# Patient Record
Sex: FEMALE | Race: WHITE | Employment: FULL TIME | URBAN - METROPOLITAN AREA
[De-identification: names, ages, dates, MRNs, and addresses within clinical notes are randomized per-mention and may not be internally consistent; named-entity substitution may affect disease eponyms.]

---

## 2019-08-25 ENCOUNTER — ED HISTORICAL/CONVERTED ENCOUNTER (OUTPATIENT)
Dept: OTHER | Age: 34
End: 2019-08-25

## 2020-09-01 ENCOUNTER — TELEPHONE (OUTPATIENT)
Dept: OBGYN CLINIC | Age: 35
End: 2020-09-01

## 2020-09-01 NOTE — TELEPHONE ENCOUNTER
I called patient and she was told to take her pill every day at the same time. She did state that she has been on an antibiotic which can be affecting her vaginal bleeding. Patient was informed to monitor and to call back after 3 weeks if still bleeding. If bleeding becomes heavy, she is to call back also.

## 2021-05-12 ENCOUNTER — APPOINTMENT (OUTPATIENT)
Dept: MRI IMAGING | Age: 36
DRG: 082 | End: 2021-05-12
Attending: EMERGENCY MEDICINE
Payer: COMMERCIAL

## 2021-05-12 ENCOUNTER — HOSPITAL ENCOUNTER (INPATIENT)
Age: 36
LOS: 2 days | Discharge: HOME | DRG: 082 | End: 2021-05-14
Attending: EMERGENCY MEDICINE | Admitting: FAMILY MEDICINE
Payer: COMMERCIAL

## 2021-05-12 DIAGNOSIS — H46.9 LEFT OPTIC NEURITIS: Primary | ICD-10-CM

## 2021-05-12 DIAGNOSIS — G37.9 DEMYELINATING DISEASE OF CENTRAL NERVOUS SYSTEM (HCC): ICD-10-CM

## 2021-05-12 PROBLEM — I63.9 CVA (CEREBRAL VASCULAR ACCIDENT) (HCC): Status: ACTIVE | Noted: 2021-05-12

## 2021-05-12 LAB
ALBUMIN SERPL-MCNC: 3.8 G/DL (ref 3.5–5)
ALBUMIN/GLOB SERPL: 0.9 {RATIO} (ref 1.1–2.2)
ALP SERPL-CCNC: 47 U/L (ref 45–117)
ALT SERPL-CCNC: 37 U/L (ref 12–78)
ANION GAP SERPL CALC-SCNC: 5 MMOL/L (ref 5–15)
APPEARANCE UR: CLEAR
AST SERPL-CCNC: 24 U/L (ref 15–37)
BACTERIA URNS QL MICRO: NEGATIVE /HPF
BASOPHILS # BLD: 0 K/UL (ref 0–0.1)
BASOPHILS NFR BLD: 1 % (ref 0–1)
BILIRUB SERPL-MCNC: 0.5 MG/DL (ref 0.2–1)
BILIRUB UR QL: NEGATIVE
BUN SERPL-MCNC: 8 MG/DL (ref 6–20)
BUN/CREAT SERPL: 12 (ref 12–20)
CALCIUM SERPL-MCNC: 9.3 MG/DL (ref 8.5–10.1)
CHLORIDE SERPL-SCNC: 106 MMOL/L (ref 97–108)
CO2 SERPL-SCNC: 27 MMOL/L (ref 21–32)
COLOR UR: NORMAL
COMMENT, HOLDF: NORMAL
CREAT SERPL-MCNC: 0.66 MG/DL (ref 0.55–1.02)
CRP SERPL-MCNC: 0.32 MG/DL (ref 0–0.6)
DIFFERENTIAL METHOD BLD: NORMAL
EOSINOPHIL # BLD: 0.1 K/UL (ref 0–0.4)
EOSINOPHIL NFR BLD: 2 % (ref 0–7)
EPITH CASTS URNS QL MICRO: NORMAL /LPF
ERYTHROCYTE [DISTWIDTH] IN BLOOD BY AUTOMATED COUNT: 12.5 % (ref 11.5–14.5)
ERYTHROCYTE [SEDIMENTATION RATE] IN BLOOD: 2 MM/HR (ref 0–20)
GLOBULIN SER CALC-MCNC: 4.3 G/DL (ref 2–4)
GLUCOSE SERPL-MCNC: 86 MG/DL (ref 65–100)
GLUCOSE UR STRIP.AUTO-MCNC: NEGATIVE MG/DL
HCG UR QL: NEGATIVE
HCT VFR BLD AUTO: 42.1 % (ref 35–47)
HGB BLD-MCNC: 13.9 G/DL (ref 11.5–16)
HGB UR QL STRIP: NEGATIVE
HYALINE CASTS URNS QL MICRO: NORMAL /LPF (ref 0–5)
IMM GRANULOCYTES # BLD AUTO: 0 K/UL (ref 0–0.04)
IMM GRANULOCYTES NFR BLD AUTO: 0 % (ref 0–0.5)
KETONES UR QL STRIP.AUTO: NEGATIVE MG/DL
LEUKOCYTE ESTERASE UR QL STRIP.AUTO: NEGATIVE
LYMPHOCYTES # BLD: 1.8 K/UL (ref 0.8–3.5)
LYMPHOCYTES NFR BLD: 28 % (ref 12–49)
MCH RBC QN AUTO: 30.1 PG (ref 26–34)
MCHC RBC AUTO-ENTMCNC: 33 G/DL (ref 30–36.5)
MCV RBC AUTO: 91.1 FL (ref 80–99)
MONOCYTES # BLD: 0.3 K/UL (ref 0–1)
MONOCYTES NFR BLD: 5 % (ref 5–13)
NEUTS SEG # BLD: 4 K/UL (ref 1.8–8)
NEUTS SEG NFR BLD: 64 % (ref 32–75)
NITRITE UR QL STRIP.AUTO: NEGATIVE
NRBC # BLD: 0 K/UL (ref 0–0.01)
NRBC BLD-RTO: 0 PER 100 WBC
PH UR STRIP: 7 [PH] (ref 5–8)
PLATELET # BLD AUTO: 246 K/UL (ref 150–400)
PMV BLD AUTO: 10.2 FL (ref 8.9–12.9)
POTASSIUM SERPL-SCNC: 4 MMOL/L (ref 3.5–5.1)
PROT SERPL-MCNC: 8.1 G/DL (ref 6.4–8.2)
PROT UR STRIP-MCNC: NEGATIVE MG/DL
RBC # BLD AUTO: 4.62 M/UL (ref 3.8–5.2)
RBC #/AREA URNS HPF: NORMAL /HPF (ref 0–5)
SAMPLES BEING HELD,HOLD: NORMAL
SODIUM SERPL-SCNC: 138 MMOL/L (ref 136–145)
SP GR UR REFRACTOMETRY: 1.01 (ref 1–1.03)
UR CULT HOLD, URHOLD: NORMAL
UROBILINOGEN UR QL STRIP.AUTO: 0.2 EU/DL (ref 0.2–1)
WBC # BLD AUTO: 6.3 K/UL (ref 3.6–11)
WBC URNS QL MICRO: NORMAL /HPF (ref 0–4)

## 2021-05-12 PROCEDURE — 99284 EMERGENCY DEPT VISIT MOD MDM: CPT

## 2021-05-12 PROCEDURE — 74011000258 HC RX REV CODE- 258: Performed by: EMERGENCY MEDICINE

## 2021-05-12 PROCEDURE — 80053 COMPREHEN METABOLIC PANEL: CPT

## 2021-05-12 PROCEDURE — 74011000250 HC RX REV CODE- 250: Performed by: FAMILY MEDICINE

## 2021-05-12 PROCEDURE — 85025 COMPLETE CBC W/AUTO DIFF WBC: CPT

## 2021-05-12 PROCEDURE — 84100 ASSAY OF PHOSPHORUS: CPT

## 2021-05-12 PROCEDURE — 83735 ASSAY OF MAGNESIUM: CPT

## 2021-05-12 PROCEDURE — A9575 INJ GADOTERATE MEGLUMI 0.1ML: HCPCS | Performed by: EMERGENCY MEDICINE

## 2021-05-12 PROCEDURE — 70553 MRI BRAIN STEM W/O & W/DYE: CPT

## 2021-05-12 PROCEDURE — 74011250636 HC RX REV CODE- 250/636: Performed by: EMERGENCY MEDICINE

## 2021-05-12 PROCEDURE — 81025 URINE PREGNANCY TEST: CPT

## 2021-05-12 PROCEDURE — 82550 ASSAY OF CK (CPK): CPT

## 2021-05-12 PROCEDURE — 74011250637 HC RX REV CODE- 250/637: Performed by: FAMILY MEDICINE

## 2021-05-12 PROCEDURE — 81001 URINALYSIS AUTO W/SCOPE: CPT

## 2021-05-12 PROCEDURE — 83690 ASSAY OF LIPASE: CPT

## 2021-05-12 PROCEDURE — 65660000000 HC RM CCU STEPDOWN

## 2021-05-12 PROCEDURE — 85652 RBC SED RATE AUTOMATED: CPT

## 2021-05-12 PROCEDURE — 96374 THER/PROPH/DIAG INJ IV PUSH: CPT

## 2021-05-12 PROCEDURE — 80061 LIPID PANEL: CPT

## 2021-05-12 PROCEDURE — 74011250636 HC RX REV CODE- 250/636: Performed by: FAMILY MEDICINE

## 2021-05-12 PROCEDURE — 36415 COLL VENOUS BLD VENIPUNCTURE: CPT

## 2021-05-12 PROCEDURE — 86140 C-REACTIVE PROTEIN: CPT

## 2021-05-12 RX ORDER — ACETAMINOPHEN 325 MG/1
650 TABLET ORAL
Status: DISCONTINUED | OUTPATIENT
Start: 2021-05-12 | End: 2021-05-14 | Stop reason: HOSPADM

## 2021-05-12 RX ORDER — DEXAMETHASONE SODIUM PHOSPHATE 4 MG/ML
10 INJECTION, SOLUTION INTRA-ARTICULAR; INTRALESIONAL; INTRAMUSCULAR; INTRAVENOUS; SOFT TISSUE
Status: DISCONTINUED | OUTPATIENT
Start: 2021-05-12 | End: 2021-05-12

## 2021-05-12 RX ORDER — SODIUM CHLORIDE 9 MG/ML
75 INJECTION, SOLUTION INTRAVENOUS CONTINUOUS
Status: DISPENSED | OUTPATIENT
Start: 2021-05-12 | End: 2021-05-13

## 2021-05-12 RX ORDER — ACETAMINOPHEN 650 MG/1
650 SUPPOSITORY RECTAL
Status: DISCONTINUED | OUTPATIENT
Start: 2021-05-12 | End: 2021-05-14 | Stop reason: HOSPADM

## 2021-05-12 RX ORDER — GADOTERATE MEGLUMINE 376.9 MG/ML
10 INJECTION INTRAVENOUS
Status: COMPLETED | OUTPATIENT
Start: 2021-05-12 | End: 2021-05-12

## 2021-05-12 RX ORDER — IBUPROFEN 200 MG
200 TABLET ORAL
COMMUNITY
End: 2021-11-18 | Stop reason: ALTCHOICE

## 2021-05-12 RX ORDER — CHOLECALCIFEROL (VITAMIN D3) 50 MCG
CAPSULE ORAL DAILY
COMMUNITY

## 2021-05-12 RX ORDER — ACETAMINOPHEN 325 MG/1
650 TABLET ORAL
COMMUNITY
End: 2021-11-18 | Stop reason: ALTCHOICE

## 2021-05-12 RX ORDER — THERA TABS 400 MCG
1 TAB ORAL DAILY
COMMUNITY

## 2021-05-12 RX ORDER — ONDANSETRON 2 MG/ML
4 INJECTION INTRAMUSCULAR; INTRAVENOUS
Status: DISCONTINUED | OUTPATIENT
Start: 2021-05-12 | End: 2021-05-14 | Stop reason: HOSPADM

## 2021-05-12 RX ORDER — DIPHENHYDRAMINE HYDROCHLORIDE 50 MG/ML
25 INJECTION, SOLUTION INTRAMUSCULAR; INTRAVENOUS
Status: DISCONTINUED | OUTPATIENT
Start: 2021-05-12 | End: 2021-05-12

## 2021-05-12 RX ORDER — NORGESTIMATE AND ETHINYL ESTRADIOL 0.25-0.035
1 KIT ORAL DAILY
COMMUNITY

## 2021-05-12 RX ORDER — SODIUM CHLORIDE 0.9 % (FLUSH) 0.9 %
10 SYRINGE (ML) INJECTION
Status: COMPLETED | OUTPATIENT
Start: 2021-05-12 | End: 2021-05-12

## 2021-05-12 RX ORDER — ASCORBIC ACID 500 MG
1000 TABLET ORAL DAILY
COMMUNITY

## 2021-05-12 RX ADMIN — SODIUM CHLORIDE 75 ML/HR: 9 INJECTION, SOLUTION INTRAVENOUS at 20:00

## 2021-05-12 RX ADMIN — SODIUM CHLORIDE 1000 MG: 900 INJECTION INTRAVENOUS at 19:19

## 2021-05-12 RX ADMIN — Medication 10 ML: at 14:40

## 2021-05-12 RX ADMIN — ACETAMINOPHEN 650 MG: 325 TABLET ORAL at 19:59

## 2021-05-12 RX ADMIN — FAMOTIDINE 20 MG: 10 INJECTION, SOLUTION INTRAVENOUS at 20:00

## 2021-05-12 RX ADMIN — GADOTERATE MEGLUMINE 10 ML: 376.9 INJECTION INTRAVENOUS at 14:39

## 2021-05-12 NOTE — ED PROVIDER NOTES
29-year-old female with no significant past medical history presents to the emergency department referred from ophthalmology for possible optic neuritis of the left eye. She reports a history of left eye pain that is worse with extraocular movement to the left and decreased peripheral vision scotoma in the left. She notes an associated mild headache that she describes as 2/10 but denies any associated nausea, vomiting. She does state that she is slightly photophobic and then I from her symptoms. She denies any other focal numbness, weakness, difficulty speaking or walking. No history of prior arrhythmia and issues or migraine headaches. Visual acuity at ophthalmologist office was 20/20 in right ankle, 20/30 and left eye with normal IOP. Ophthalmologist recommended lab evaluation and MRI of brain and orbits with neurology consultation. Vision Change   Associated symptoms include photophobia. Pertinent negatives include no numbness, no nausea, no vomiting, no weakness and no fever. History reviewed. No pertinent past medical history. No past surgical history on file. History reviewed. No pertinent family history.     Social History     Socioeconomic History    Marital status:      Spouse name: Not on file    Number of children: Not on file    Years of education: Not on file    Highest education level: Not on file   Occupational History    Not on file   Social Needs    Financial resource strain: Not on file    Food insecurity     Worry: Not on file     Inability: Not on file    Transportation needs     Medical: Not on file     Non-medical: Not on file   Tobacco Use    Smoking status: Not on file   Substance and Sexual Activity    Alcohol use: Not on file    Drug use: Not on file    Sexual activity: Not on file   Lifestyle    Physical activity     Days per week: Not on file     Minutes per session: Not on file    Stress: Not on file   Relationships    Social connections Talks on phone: Not on file     Gets together: Not on file     Attends Bahai service: Not on file     Active member of club or organization: Not on file     Attends meetings of clubs or organizations: Not on file     Relationship status: Not on file    Intimate partner violence     Fear of current or ex partner: Not on file     Emotionally abused: Not on file     Physically abused: Not on file     Forced sexual activity: Not on file   Other Topics Concern    Not on file   Social History Narrative    Not on file         ALLERGIES: Patient has no known allergies. Review of Systems   Constitutional: Negative for activity change, appetite change, chills and fever. HENT: Negative for congestion, rhinorrhea, sinus pressure, sneezing and sore throat. Eyes: Positive for photophobia and visual disturbance. Respiratory: Negative for cough and shortness of breath. Cardiovascular: Negative for chest pain. Gastrointestinal: Negative for abdominal pain, blood in stool, constipation, diarrhea, nausea and vomiting. Genitourinary: Negative for difficulty urinating, dysuria, flank pain, frequency, hematuria, menstrual problem, urgency, vaginal bleeding and vaginal discharge. Musculoskeletal: Negative for arthralgias, back pain, myalgias and neck pain. Skin: Negative for rash and wound. Neurological: Positive for headaches. Negative for syncope, weakness and numbness. Psychiatric/Behavioral: Negative for self-injury and suicidal ideas. All other systems reviewed and are negative. Vitals:    05/12/21 1112   BP: 135/79   Pulse: 73   Resp: 16   Temp: 98.4 °F (36.9 °C)   SpO2: 100%            Physical Exam  Vitals signs and nursing note reviewed. Constitutional:       General: She is not in acute distress. Appearance: Normal appearance. She is well-developed. She is not diaphoretic. Comments: Pleasant, no acute distress. HENT:      Head: Normocephalic and atraumatic.       Nose: Nose normal.   Eyes:      Extraocular Movements: Extraocular movements intact. Conjunctiva/sclera: Conjunctivae normal.      Pupils: Pupils are equal, round, and reactive to light. Comments: Decreased vision in left upper and lower visual fields and left eye, right eye reportedly normal.  Pupils dilated from ophthalmologic exam prior to arrival.   Neck:      Musculoskeletal: Normal range of motion and neck supple. Cardiovascular:      Rate and Rhythm: Normal rate and regular rhythm. Heart sounds: Normal heart sounds. Pulmonary:      Effort: Pulmonary effort is normal.      Breath sounds: Normal breath sounds. Abdominal:      General: There is no distension. Palpations: Abdomen is soft. Tenderness: There is no abdominal tenderness. Musculoskeletal:         General: No tenderness. Skin:     General: Skin is warm and dry. Neurological:      General: No focal deficit present. Mental Status: She is alert and oriented to person, place, and time. Cranial Nerves: No cranial nerve deficit. Sensory: No sensory deficit. Motor: No weakness. Coordination: Coordination normal.      Gait: Gait normal.      Comments: Intact sensation, 5/5 strength in all 4 extremities, intact finger to nose, neg pronator drift, fluent speech, CN intact. MDM   29-year-old female presents referred from ophthalmology for evaluation for possible optic neuritis with MRI, labs and neurology consultation. She is afebrile with vital signs stable no acute distress. Otherwise neuro intact, as above. UA shows no evidence of infection  hCG negative   Labs returned showing no significant abnormalities, normal inflammatory markers  MRI brain/orbits shows evidence suggestive of left optic neuritis    Neurology was consulted    Procedures    3:45 discussed case with Dr. Tyrese Navarro who recommends admission to the hospitalist service with 1 g IV Solu-Medrol.      Perfect Serve Consult for Admission  4:18 PM    ED Room Number: R38/R38  Patient Name and age:  Joyce Hayes 30 y.o.  female  Working Diagnosis:   1. Left optic neuritis        COVID-19 Suspicion:  no  Sepsis present:  no  Reassessment needed: no  Code Status:  Full Code  Readmission: no  Isolation Requirements:  no  Recommended Level of Care:  med/surg  Department:[facility] Adult ED - 21   Other: MRI shows optic neuritis. Patient presented with left sided visual field cut.  Neurology on board and rec'd 1 gm solumedrol daily and further neuro eval.

## 2021-05-12 NOTE — PROGRESS NOTES
Admission Medication Reconciliation:      Spoke with patient by telephone @ [phone], unable to speak with patient face to face at this time due to general isolation precautions in the ED related to COVID-19 pandemic. Patient is a reliable historian. RX query is available at this time. Interview included questions regarding use of:  PTA medications including prescription/OTC, vitamins, supplements, inhaled, topical, injectable, otic and ophthalmic medications  Alcohol, nicotine products, THC and related compounds, illicit drugs, stimulant use (i.e., Red Bull), agents used to assist with sleep and/or pain control issues, and whether patient uses other people's medications of any kind    Medication changes (never reviewed): Added all agents    Thank you for allowing me to participate in the care of your patient. Coleen Wiley PharmD, RN # [phone]       [facility] pharmacy benefit data reflects medications filled and processed through the patient's insurance, however   this data does NOT capture whether the medication was picked up or is currently being taken by the patient. Allergies:  Patient has no known allergies. Significant PMH/Disease States: History reviewed. No pertinent past medical history. Chief Complaint for this Admission:    Chief Complaint   Patient presents with    Vision Change     Prior to Admission Medications:   Prior to Admission Medications   Prescriptions Last Dose Informant Taking? B.infantis-B.ani-B.long-B.bifi (Probiotic 4X) 10-15 mg TbEC 5/11/2021 at Unknown time  Yes   Sig: Take  by mouth daily. acetaminophen (TYLENOL) 325 mg tablet 5/5/2021 at Unknown time  Yes   Sig: Take 650 mg by mouth every four (4) hours as needed for Pain. ascorbic acid, vitamin C, (Vitamin C) 500 mg tablet 5/11/2021 at Unknown time  Yes   Sig: Take 1,000 mg by mouth daily.    ibuprofen (AdviL) 200 mg tablet 5/5/2021 at Unknown time  Yes   Sig: Take 200 mg by mouth every eight (8) hours as needed for Pain.   norgestimate-ethinyl estradioL (Sprintec, 28,) 0.25-35 mg-mcg tab 5/11/2021 at Unknown time  Yes   Sig: Take 1 Tab by mouth daily. therapeutic multivitamin (THERAGRAN) tablet 5/11/2021 at Unknown time  Yes   Sig: Take 1 Tab by mouth daily. Facility-Administered Medications: None     Please contact the main inpatient pharmacy with any questions or concerns at [phone] and we will direct you to the clinical pharmacist covering this patient's care while in-house.    NANCY Marshall

## 2021-05-12 NOTE — H&P
History & Physical    Primary Care Provider: None  Source of Information: Patient     History of Presenting Illness:   Alberto Tenorio is a 30 y.o. female who presents with left eye vision problems    History was probably obtained from the patient    Patient reports that she started experiencing blurred vision in the left eye associated with eye pain with extraocular movement to the left. Patient reports that she had mild headache associated with her symptoms. Got concerned and went to her ophthalmologist who told her to come to the ER for concern for a central process. Patient reports that she wears glasses off and on but has never had any eye problems. [place] and is concerned about Lyme disease. Patient reports that she was also slightly photophobic. Patient came to the ER and was found to have optic neuritis and was requested to be admitted to the hospital service. Patient denies any other complaints or problems. Patient denies any history of multiple sclerosis    The patient denies any sore throat, trouble swallowing, trouble with speech, chest pain, SOB, cough, fever, chills, N/V/D, abd pain, urinary symptoms, constipation, recent travels, sick contacts, focal or generalized neurological symptoms,  falls, injuries, rashes, contact with COVID-19 diagnosed patients, hematemesis, melena, hemoptysis, hematuria, rashes, denies starting any new medications and denies any other concerns or problems besides as mentioned above. Review of Systems:  A comprehensive review of systems was negative except for that written in the History of Present Illness. All other systems reviewed, pertinent positives and negatives noted in HPI    History reviewed. No pertinent past medical history. No past surgical history on file. Prior to Admission medications    Medication Sig Start Date End Date Taking?  Authorizing Provider   norgestimate-ethinyl estradioL (Sprintec, 28,) 0.25-35 mg-mcg tab Take 1 Tab by mouth daily. Yes Provider, Historical   therapeutic multivitamin (THERAGRAN) tablet Take 1 Tab by mouth daily. Yes Provider, Historical   ascorbic acid, vitamin C, (Vitamin C) 500 mg tablet Take 1,000 mg by mouth daily. Yes Provider, Historical   B.infantis-B.ani-B.long-B.bifi (Probiotic 4X) 10-15 mg TbEC Take  by mouth daily. Yes Provider, Historical   acetaminophen (TYLENOL) 325 mg tablet Take 650 mg by mouth every four (4) hours as needed for Pain. Yes Provider, Historical   ibuprofen (AdviL) 200 mg tablet Take 200 mg by mouth every eight (8) hours as needed for Pain. Yes Provider, Historical     No Known Allergies   History reviewed. No pertinent family history. Family history was discussed with the patient, all pertinent and relevant details are mentioned as above, no other pertinent and relevant family history was noted on my discussion with the patient.   Patient specifically denies any history of Gaucher disease in the family  SOCIAL HISTORY:  Patient resides:  Independently x   Assisted Living    SNF    With family care       Smoking history:   None    Former x   Chronic      Alcohol history:   None    Social x   Chronic      Ambulates:   Independently x   w/cane    w/walker    w/wc    CODE STATUS:  DNR    Full x   Other      Objective:     Physical Exam:     Visit Vitals  /79   Pulse 73   Temp 98.4 °F (36.9 °C)   Resp 16   SpO2 100%      O2 Device: None (Room air)    General : alert x 3, awake, no acute distress, resting in bed, pleasant /female, appears to be stated age  [de-identified]: PEERL, EOMI, decreased visual acuity left eye  Neck: supple, no JVD, no meningeal signs  Chest: Clear to auscultation bilaterally   CVS: S1 S2 heard, Capillary refill less than 2 seconds  Abd: soft/ Non tender, non distended, BS physiological,   Ext: no clubbing, no cyanosis, no edema, brisk 2+ DP pulses  Neuro/Psych: pleasant mood and affect, CN 2-12 grossly intact, sensory grossly within normal limit, Strength 5/5 in all extremities, DTR 1+ x 4  Skin: warm           Data Review:     Recent Days:  Recent Labs     05/12/21  1201   WBC 6.3   HGB 13.9   HCT 42.1        Recent Labs     05/12/21  1201      K 4.0      CO2 27   GLU 86   BUN 8   CREA 0.66   CA 9.3   ALB 3.8   ALT 37     No results for input(s): PH, PCO2, PO2, HCO3, FIO2 in the last 72 hours. 24 Hour Results:  Recent Results (from the past 24 hour(s))   CBC WITH AUTOMATED DIFF    Collection Time: 05/12/21 12:01 PM   Result Value Ref Range    WBC 6.3 3.6 - 11.0 K/uL    RBC 4.62 3.80 - 5.20 M/uL    HGB 13.9 11.5 - 16.0 g/dL    HCT 42.1 35.0 - 47.0 %    MCV 91.1 80.0 - 99.0 FL    MCH 30.1 26.0 - 34.0 PG    MCHC 33.0 30.0 - 36.5 g/dL    RDW 12.5 11.5 - 14.5 %    PLATELET 985 224 - 077 K/uL    MPV 10.2 8.9 - 12.9 FL    NRBC 0.0 0  WBC    ABSOLUTE NRBC 0.00 0.00 - 0.01 K/uL    NEUTROPHILS 64 32 - 75 %    LYMPHOCYTES 28 12 - 49 %    MONOCYTES 5 5 - 13 %    EOSINOPHILS 2 0 - 7 %    BASOPHILS 1 0 - 1 %    IMMATURE GRANULOCYTES 0 0.0 - 0.5 %    ABS. NEUTROPHILS 4.0 1.8 - 8.0 K/UL    ABS. LYMPHOCYTES 1.8 0.8 - 3.5 K/UL    ABS. MONOCYTES 0.3 0.0 - 1.0 K/UL    ABS. EOSINOPHILS 0.1 0.0 - 0.4 K/UL    ABS. BASOPHILS 0.0 0.0 - 0.1 K/UL    ABS. IMM. GRANS. 0.0 0.00 - 0.04 K/UL    DF AUTOMATED     METABOLIC PANEL, COMPREHENSIVE    Collection Time: 05/12/21 12:01 PM   Result Value Ref Range    Sodium 138 136 - 145 mmol/L    Potassium 4.0 3.5 - 5.1 mmol/L    Chloride 106 97 - 108 mmol/L    CO2 27 21 - 32 mmol/L    Anion gap 5 5 - 15 mmol/L    Glucose 86 65 - 100 mg/dL    BUN 8 6 - 20 MG/DL    Creatinine 0.66 0.55 - 1.02 MG/DL    BUN/Creatinine ratio 12 12 - 20      GFR est AA >60 >60 ml/min/1.73m2    GFR est non-AA >60 >60 ml/min/1.73m2    Calcium 9.3 8.5 - 10.1 MG/DL    Bilirubin, total 0.5 0.2 - 1.0 MG/DL    ALT (SGPT) 37 12 - 78 U/L    AST (SGOT) 24 15 - 37 U/L    Alk.  phosphatase 47 45 - 117 U/L    Protein, total 8.1 6.4 - 8.2 g/dL    Albumin 3.8 3.5 - 5.0 g/dL    Globulin 4.3 (H) 2.0 - 4.0 g/dL    A-G Ratio 0.9 (L) 1.1 - 2.2     SED RATE (ESR)    Collection Time: 05/12/21 12:01 PM   Result Value Ref Range    Sed rate, automated 2 0 - 20 mm/hr   C REACTIVE PROTEIN, QT    Collection Time: 05/12/21 12:01 PM   Result Value Ref Range    C-Reactive protein 0.32 0.00 - 0.60 mg/dL   HCG URINE, QL. - POC    Collection Time: 05/12/21 12:03 PM   Result Value Ref Range    Pregnancy test,urine (POC) Negative NEG     URINALYSIS W/MICROSCOPIC    Collection Time: 05/12/21 12:06 PM   Result Value Ref Range    Color YELLOW/STRAW      Appearance CLEAR CLEAR      Specific gravity 1.009 1.003 - 1.030      pH (UA) 7.0 5.0 - 8.0      Protein Negative NEG mg/dL    Glucose Negative NEG mg/dL    Ketone Negative NEG mg/dL    Bilirubin Negative NEG      Blood Negative NEG      Urobilinogen 0.2 0.2 - 1.0 EU/dL    Nitrites Negative NEG      Leukocyte Esterase Negative NEG      WBC 0-4 0 - 4 /hpf    RBC 0-5 0 - 5 /hpf    Epithelial cells FEW FEW /lpf    Bacteria Negative NEG /hpf    Hyaline cast 0-2 0 - 5 /lpf   URINE CULTURE HOLD SAMPLE    Collection Time: 05/12/21 12:06 PM    Specimen: Serum; Urine   Result Value Ref Range    Urine culture hold        Urine on hold in Microbiology dept for 2 days. If unpreserved urine is submitted, it cannot be used for addtional testing after 24 hours, recollection will be required. SAMPLES BEING HELD    Collection Time: 05/12/21 12:06 PM   Result Value Ref Range    SAMPLES BEING HELD 1RED,1BLU     COMMENT        Add-on orders for these samples will be processed based on acceptable specimen integrity and analyte stability, which may vary by analyte. Imaging:   [facility] SURGICAL [facility] OF [facility] Cont    Result Date: 5/12/2021  1. Findings consistent with left optic neuritis involving the intracanalicular and prechiasmatic left optic nerve. 2. Otherwise unremarkable brain MRI.  No white matter lesions identified. 3. 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     Assessment/Plan     Optic neuritis: Patient will be admitted on a neuro telemetry bed, start patient on Solu-Medrol 1 g IV per neurology recommendations, neurovascular checks, patient is a monofilament and her MRI tonight, will discuss with neurology may consider getting a C-spine MRI, IV hydration, supportive care and close monitoring, further intervention per hospitalist, reassess as needed    GI/DVT prophylaxis: SCD             Please note that this dictation was completed with [employer], the computer voice recognition software. Quite often unanticipated grammatical, syntax, homophones, and other interpretive errors are inadvertently transcribed by the computer software. Please disregard these errors. Please excuse any errors that have escaped final proofreading.          Signed By: Roberto Casanova MD     May 12, 2021

## 2021-05-12 NOTE — ED TRIAGE NOTES
Pt c/o left eye pain and loss of peripheral vision since last Thursday, pt seen at [facility] and sent here for possible neuritis

## 2021-05-12 NOTE — ROUTINE PROCESS
TRANSFER - OUT REPORT: 
 
Verbal report given to Ignacio Pacheco (name) on Anna Herrera  being transferred to [address] [facility] (unit) for routine progression of care Report consisted of patients Situation, Background, Assessment and  
Recommendations(SBAR). Information from the following report(s) SBAR, ED Summary, [facility] and Recent Results was reviewed with the receiving nurse. Lines:  
Peripheral IV 05/12/21 Right Antecubital (Active) Site Assessment Clean, dry, & intact 05/12/21 1207 Phlebitis Assessment 0 05/12/21 1207 Infiltration Assessment 0 05/12/21 1207 Dressing Status Clean, dry, & intact 05/12/21 1207 Dressing Type Transparent 05/12/21 1207 Hub Color/Line Status Pink 05/12/21 1207 Opportunity for questions and clarification was provided. Patient transported with: 
 [employer]

## 2021-05-13 ENCOUNTER — APPOINTMENT (OUTPATIENT)
Dept: MRI IMAGING | Age: 36
DRG: 082 | End: 2021-05-13
Attending: PSYCHIATRY & NEUROLOGY
Payer: COMMERCIAL

## 2021-05-13 PROBLEM — H46.9 OPTIC NEURITIS, LEFT: Status: ACTIVE | Noted: 2021-05-13

## 2021-05-13 PROBLEM — I63.9 CVA (CEREBRAL VASCULAR ACCIDENT) (HCC): Status: RESOLVED | Noted: 2021-05-12 | Resolved: 2021-05-13

## 2021-05-13 LAB
CHOLEST SERPL-MCNC: 224 MG/DL
CK SERPL-CCNC: 54 U/L (ref 26–192)
CRP SERPL HS-MCNC: 2.6 MG/L
ERYTHROCYTE [DISTWIDTH] IN BLOOD BY AUTOMATED COUNT: 12.2 % (ref 11.5–14.5)
ERYTHROCYTE [SEDIMENTATION RATE] IN BLOOD: 10 MM/HR (ref 0–20)
EST. AVERAGE GLUCOSE BLD GHB EST-MCNC: 94 MG/DL
HBA1C MFR BLD: 4.9 % (ref 4–5.6)
HCT VFR BLD AUTO: 40.3 % (ref 35–47)
HDLC SERPL-MCNC: 67 MG/DL
HDLC SERPL: 3.3 {RATIO} (ref 0–5)
HGB BLD-MCNC: 13.5 G/DL (ref 11.5–16)
LDLC SERPL CALC-MCNC: 138.8 MG/DL (ref 0–100)
LIPASE SERPL-CCNC: 170 U/L (ref 73–393)
MAGNESIUM SERPL-MCNC: 2.1 MG/DL (ref 1.6–2.4)
MCH RBC QN AUTO: 30.1 PG (ref 26–34)
MCHC RBC AUTO-ENTMCNC: 33.5 G/DL (ref 30–36.5)
MCV RBC AUTO: 90 FL (ref 80–99)
NRBC # BLD: 0 K/UL (ref 0–0.01)
NRBC BLD-RTO: 0 PER 100 WBC
PHOSPHATE SERPL-MCNC: 4 MG/DL (ref 2.6–4.7)
PLATELET # BLD AUTO: 241 K/UL (ref 150–400)
PMV BLD AUTO: 10 FL (ref 8.9–12.9)
RBC # BLD AUTO: 4.48 M/UL (ref 3.8–5.2)
TRIGL SERPL-MCNC: 91 MG/DL (ref ?–150)
TSH SERPL DL<=0.05 MIU/L-ACNC: 0.6 UIU/ML (ref 0.36–3.74)
VLDLC SERPL CALC-MCNC: 18.2 MG/DL
WBC # BLD AUTO: 6.6 K/UL (ref 3.6–11)

## 2021-05-13 PROCEDURE — 65660000000 HC RM CCU STEPDOWN

## 2021-05-13 PROCEDURE — 72141 MRI NECK SPINE W/O DYE: CPT

## 2021-05-13 PROCEDURE — 85652 RBC SED RATE AUTOMATED: CPT

## 2021-05-13 PROCEDURE — 85027 COMPLETE CBC AUTOMATED: CPT

## 2021-05-13 PROCEDURE — 99223 1ST HOSP IP/OBS HIGH 75: CPT | Performed by: PSYCHIATRY & NEUROLOGY

## 2021-05-13 PROCEDURE — 86618 LYME DISEASE ANTIBODY: CPT

## 2021-05-13 PROCEDURE — 74011000258 HC RX REV CODE- 258: Performed by: FAMILY MEDICINE

## 2021-05-13 PROCEDURE — 86141 C-REACTIVE PROTEIN HS: CPT

## 2021-05-13 PROCEDURE — 74011250637 HC RX REV CODE- 250/637: Performed by: FAMILY MEDICINE

## 2021-05-13 PROCEDURE — 83520 IMMUNOASSAY QUANT NOS NONAB: CPT

## 2021-05-13 PROCEDURE — 74011000250 HC RX REV CODE- 250: Performed by: FAMILY MEDICINE

## 2021-05-13 PROCEDURE — 84443 ASSAY THYROID STIM HORMONE: CPT

## 2021-05-13 PROCEDURE — 74011250637 HC RX REV CODE- 250/637: Performed by: NURSE PRACTITIONER

## 2021-05-13 PROCEDURE — 83036 HEMOGLOBIN GLYCOSYLATED A1C: CPT

## 2021-05-13 PROCEDURE — 36415 COLL VENOUS BLD VENIPUNCTURE: CPT

## 2021-05-13 PROCEDURE — 74011250636 HC RX REV CODE- 250/636: Performed by: FAMILY MEDICINE

## 2021-05-13 RX ORDER — LANOLIN ALCOHOL/MO/W.PET/CERES
3 CREAM (GRAM) TOPICAL
COMMUNITY
End: 2021-11-18 | Stop reason: ALTCHOICE

## 2021-05-13 RX ORDER — LANOLIN ALCOHOL/MO/W.PET/CERES
3 CREAM (GRAM) TOPICAL
Status: DISCONTINUED | OUTPATIENT
Start: 2021-05-13 | End: 2021-05-14 | Stop reason: HOSPADM

## 2021-05-13 RX ADMIN — FAMOTIDINE 20 MG: 10 INJECTION, SOLUTION INTRAVENOUS at 22:48

## 2021-05-13 RX ADMIN — FAMOTIDINE 20 MG: 10 INJECTION, SOLUTION INTRAVENOUS at 08:31

## 2021-05-13 RX ADMIN — ACETAMINOPHEN 650 MG: 325 TABLET ORAL at 10:24

## 2021-05-13 RX ADMIN — Medication 3 MG: at 22:48

## 2021-05-13 RX ADMIN — ACETAMINOPHEN 650 MG: 325 TABLET ORAL at 19:10

## 2021-05-13 RX ADMIN — ACETAMINOPHEN 650 MG: 325 TABLET ORAL at 14:29

## 2021-05-13 RX ADMIN — ACETAMINOPHEN 650 MG: 325 TABLET ORAL at 22:48

## 2021-05-13 RX ADMIN — SODIUM CHLORIDE 1000 MG: 900 INJECTION INTRAVENOUS at 08:31

## 2021-05-13 RX ADMIN — ACETAMINOPHEN 650 MG: 325 TABLET ORAL at 06:26

## 2021-05-13 NOTE — PROGRESS NOTES
Transition of Care Plan  RUR 5%    Neurology consulted  MRI completed    Disposition  Home  Transportation   Family  Medical follow up   Specialist and PCP at [facility]     Contacts  Boyfriend-- Rodrigo Luciakelli  [phone]  Mother --Richi Dietrich  [phone]    Reason for Admission:  CVA-- Optic Neuritis  Patient came to hospital from ophthalmologist office due to blurred vision in the left eye                   RUR Score:        5%             Plan for utilizing home health:  Not indicated at this time        PCP: First and Last name:  None  Uses [facility] in [place]  Has an OBGYN   Name of Practice:    Are you a current patient: Yes/No:    Approximate date of last visit:    Can you participate in a virtual visit with your PCP:                     Current Advanced Directive/Advance Care Plan: Full Code   Main Contact is aneudyfriend-- Rodrigo Liz [phone]      Healthcare Decision Maker:   Click here to [address] including selection of the Healthcare Decision Maker Relationship (ie \"Primary\")                           Transition of Care Plan:    Home with medical follow up and family support    Cm met with patient in her room to introduce self and explain role. Patient was alert and oriented. Confirmed demographics. No PCP--using [facility] in [place] and [facility]. Patient lives at home with her boyfriend,Maurisio in a two level home on 50 acres of land-- (has goats and chickens)  [address] [place] [address] and built the home. Patient is   and  has 2 children 14 and 10 who are with her 59% of the time. Phuong Ingram as a 12year old who resides with them. Patient's mother and step father live close by and are supportive. 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 Patient plans to return home when discharged. Boyfriend or family will transport home. No identified needs at this time but Cm will follow to assist with any needs that arise as patient may have additional testing. Care Management Interventions  PCP Verified by CM: (no PCP)  Mode of Transport at Discharge: (car)  Transition of Care Consult (CM Consult): Discharge Planning  Physical Therapy Consult: No  Occupational Therapy Consult: No  Current Support Network:  Other(lives with boyfriend in their home-- self care and indepedent prior to admission   No AMD)  Confirm Follow Up Transport: Family(self when able)  Discharge Location  Discharge Placement: Home(TBD-- Cm to follow and assist with any needs that arise)

## 2021-05-13 NOTE — PROGRESS NOTES
Problem: Falls - Risk of  Goal: *Absence of Falls  Description: Document Joe Caroleen Fall Risk and appropriate interventions in the flowsheet.   Outcome: Progressing Towards Goal  Note: Fall Risk Interventions:                                Problem: TIA/CVA Stroke: 0-24 hours  Goal: Activity/Safety  Outcome: Progressing Towards Goal  Goal: Diagnostic Test/Procedures  Outcome: Progressing Towards Goal  Goal: Medications  Outcome: Progressing Towards Goal  Goal: Respiratory  Outcome: Progressing Towards Goal

## 2021-05-13 NOTE — PROGRESS NOTES
[facility]  Hospitalist Group                                                                                          Hospitalist Progress Note  Maria Ines Heck MD  Answering service: [phone] OR [phone] from in house phone              Progress Note    Patient: Gwen Katz MRN: [id number]  SSN: [id number]    YOB: 1985  Age: 30 y.o. Sex: female      Admit Date: 5/12/2021    LOS: 1 day     Subjective:     Patient presents with blurry vision in left eye along with pain with movement. Further work-up with MRI shows optic neuritis on the left side. Still with blurry vision and pain in the left eye. Pending neurology evaluation. Patient denies any headache, dizziness, chest pain, shortness of breath, nausea, vomiting, abdominal pain. Objective:     Vitals:    05/13/21 0600 05/13/21 0828 05/13/21 1005 05/13/21 1400   BP: 105/68  (!) 81/63 108/69   Pulse: 69  85 73   Resp: 14  15 15   Temp: 97.8 °F (36.6 °C)  98.1 °F (36.7 °C) 98.1 °F (36.7 °C)   SpO2: 98%  97% 95%   Weight:       Height:  5' 1\" (1.549 m)          Intake and Output:  Current Shift: No intake/output data recorded. Last three shifts: 05/11 1901 - 05/13 0700  In: 600 [I.V.:600]  Out: -     Physical Exam:   GENERAL: alert, cooperative, no distress, appears stated age  THROAT & NECK: normal and no erythema or exudates noted. LUNG: clear to auscultation bilaterally  HEART: regular rate and rhythm, S1, S2 normal, no murmur, click, rub or gallop  ABDOMEN: soft, non-tender. Bowel sounds normal. No masses,  no organomegaly  EXTREMITIES:  extremities normal, atraumatic, no cyanosis or edema  SKIN: no rash or abnormalities  NEUROLOGIC: AOx3. PSYCHIATRIC: non focal    Lab/Data Review: All lab results for the last 24 hours reviewed.      Recent Results (from the past 24 hour(s))   TSH 3RD GENERATION    Collection Time: 05/13/21  2:15 AM   Result Value Ref Range    TSH 0.60 0.36 - 3.74 uIU/mL   CRP, HIGH SENSITIVITY    Collection Time: 05/13/21  2:15 AM   Result Value Ref Range    CRP, High sensitivity 2.6 mg/L   HEMOGLOBIN A1C WITH EAG    Collection Time: 05/13/21  2:16 AM   Result Value Ref Range    Hemoglobin A1c 4.9 4.0 - 5.6 %    Est. average glucose 94 mg/dL   CBC W/O DIFF    Collection Time: 05/13/21  2:16 AM   Result Value Ref Range    WBC 6.6 3.6 - 11.0 K/uL    RBC 4.48 3.80 - 5.20 M/uL    HGB 13.5 11.5 - 16.0 g/dL    HCT 40.3 35.0 - 47.0 %    MCV 90.0 80.0 - 99.0 FL    MCH 30.1 26.0 - 34.0 PG    MCHC 33.5 30.0 - 36.5 g/dL    RDW 12.2 11.5 - 14.5 %    PLATELET 203 893 - 075 K/uL    MPV 10.0 8.9 - 12.9 FL    NRBC 0.0 0  WBC    ABSOLUTE NRBC 0.00 0.00 - 0.01 K/uL   SED RATE (ESR)    Collection Time: 05/13/21  2:16 AM   Result Value Ref Range    Sed rate, automated 10 0 - 20 mm/hr        Imaging:    Mri Brain W Wo Cont    Result Date: 5/12/2021  EXAM:  MRI BRAIN W WO CONT INDICATION:    possible left optic neuritis COMPARISON:  CT head 8/25/2019. CONTRAST: 10 ml Dotarem. TECHNIQUE:  Multiplanar multisequence acquisition without and with contrast of the brain and orbits. FINDINGS: There is mild enlargement, STIR hyperintensity and abnormal enhancement of intracanalicular and prechiasmatic left optic nerve (series 13 images 16 through 18 and series 14 image 8). The right optic nerve is normal in size and signal. The globes and extraocular muscles are normal. There is no evidence of an intraorbital mass. There is an incidental small cystic lesion at the posterior superior aspect of the pituitary gland measuring 4 mm (series 6 image 22). The ventricles are normal in size and position. The brain parenchyma otherwise has normal signal characteristics. There is no acute infarct, extra-axial fluid collection, or mass effect. There is no cerebellar tonsillar herniation. Expected arterial flow-voids are present. No evidence of abnormal parenchymal enhancement.  Scattered mucosal thickening in the bilateral ethmoidal air cells and maxillary sinuses without air-fluid level. Trace right mastoid effusion. No significant osseous or scalp lesions are identified. 1. Findings consistent with left optic neuritis involving the intracanalicular and prechiasmatic left optic nerve. 2. Otherwise unremarkable brain MRI. No white matter lesions identified. 3. 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        Assessment and Plan:     Optic neuritis  -MRI shows left optic neuritis involving the intracanalicular and perichiasmatic left optic nerve  -On high-dose Solu-Medrol 1 g daily  -Neurology evaluation for further work-up    Tick bite  -Patient with history of tick bite  -Lyme titers pending  -No active clinical signs suggesting acute infection    Discharge disposition: Likely in the next 24 to 48 hours pending further work-up from neurology.     Signed By: Evelyn Zhang MD     May 13, 2021

## 2021-05-13 NOTE — CONSULTS
Neuro consult completed. Dictated note to follow. Exam with left subtle APD, o/w non-focal.  Pt with left ON. MRI brain without any lesion, no history of focal neurological deficits in the past, no h/o autoimmune disease. MRI of C-spine to assess for demyelinating disease. NMO-Ab ordered. IV solumedrol 1g daily x 5 days, today is day 2/5, followed by prednisone taper 60mg daily x 2 days, 40mg daily x 2 days, 20mg daily x 2 days, 10mg daily x 2 days, 5mg daily x 2 days.

## 2021-05-13 NOTE — PROGRESS NOTES
Problem: Falls - Risk of  Goal: *Absence of Falls  Description: Document Daquan Fuentes Fall Risk and appropriate interventions in the flowsheet.   Outcome: Progressing Towards Goal  Note: Fall Risk Interventions:

## 2021-05-14 VITALS
HEART RATE: 61 BPM | HEIGHT: 61 IN | TEMPERATURE: 98.2 F | SYSTOLIC BLOOD PRESSURE: 101 MMHG | RESPIRATION RATE: 14 BRPM | WEIGHT: 134.7 LBS | OXYGEN SATURATION: 95 % | DIASTOLIC BLOOD PRESSURE: 60 MMHG | BODY MASS INDEX: 25.43 KG/M2

## 2021-05-14 PROCEDURE — 74011250636 HC RX REV CODE- 250/636: Performed by: FAMILY MEDICINE

## 2021-05-14 PROCEDURE — 74011000258 HC RX REV CODE- 258: Performed by: PSYCHIATRY & NEUROLOGY

## 2021-05-14 PROCEDURE — 74011000250 HC RX REV CODE- 250: Performed by: FAMILY MEDICINE

## 2021-05-14 PROCEDURE — 99233 SBSQ HOSP IP/OBS HIGH 50: CPT | Performed by: PSYCHIATRY & NEUROLOGY

## 2021-05-14 PROCEDURE — 74011250637 HC RX REV CODE- 250/637: Performed by: FAMILY MEDICINE

## 2021-05-14 PROCEDURE — 74011250636 HC RX REV CODE- 250/636: Performed by: PSYCHIATRY & NEUROLOGY

## 2021-05-14 RX ORDER — PREDNISONE 10 MG/1
TABLET ORAL
Qty: 30 TAB | Refills: 0 | Status: SHIPPED | OUTPATIENT
Start: 2021-05-14 | End: 2021-11-18 | Stop reason: ALTCHOICE

## 2021-05-14 RX ORDER — FAMOTIDINE 20 MG/1
20 TABLET, FILM COATED ORAL 2 TIMES DAILY
Status: DISCONTINUED | OUTPATIENT
Start: 2021-05-14 | End: 2021-05-14 | Stop reason: HOSPADM

## 2021-05-14 RX ADMIN — FAMOTIDINE 20 MG: 10 INJECTION, SOLUTION INTRAVENOUS at 09:12

## 2021-05-14 RX ADMIN — ACETAMINOPHEN 650 MG: 325 TABLET ORAL at 12:48

## 2021-05-14 RX ADMIN — SODIUM CHLORIDE 1000 MG: 900 INJECTION INTRAVENOUS at 09:13

## 2021-05-14 RX ADMIN — ACETAMINOPHEN 650 MG: 325 TABLET ORAL at 06:41

## 2021-05-14 NOTE — DISCHARGE INSTRUCTIONS
Patient Education        Optic Neuritis: Care Instructions  Your Care Instructions     Optic neuritis is a problem in the eye's optic nerve. This is the nerve that moves light and images from the eye to the brain. Optic neuritis causes the nerve to swell. You may have blurred or double vision or even loss of vision. Your doctor may not know what caused the problem with your eye. In some cases, a virus infects the nerve. Sometimes the body's defenses (immune system) mistakenly attack the body's own cells, such as the nerves. Many people with multiple sclerosis (MS), which is an immune system disease, have optic neuritis at some time. It can be the first symptom of MS. Your doctor may want to watch your symptoms or may give you medicine, which can reduce the swelling of your optic nerve. In either case, he or she will carefully keep track of your condition. Follow-up care is a key part of your treatment and safety. Be sure to make and go to all appointments, and call your doctor if you are having problems. It's also a good idea to know your test results and keep a list of the medicines you take. How can you care for yourself at home? · Take your medicines exactly as prescribed. Call your doctor if you think you are having a problem with your medicine. · Follow any directions your doctor gives you to care for your eye. · Avoid bright lights or use dark glasses to protect the eye. · Over-the-counter eyedrops, such as artificial tears, may help the eye feel better. Choose a product that does not contain preservatives. When should you call for help? Call your doctor now or seek immediate medical care if:    · You have sudden loss of vision. Watch closely for changes in your health, and be sure to contact your doctor if:    · Your vision gets worse.     · You have pain in your eye.     · You do not get better as expected. Where can you learn more?   Go to http://www.gray.com/  Enter D726 in the search box to learn more about \"Optic Neuritis: Care Instructions. \"  Current as of: August 31, 2020               Content Version: 12.8  © 2006-2021 [employer]wise, [employer]. Care instructions adapted under license by [employer] (which disclaims liability or warranty for this information). If you have questions about a medical condition or this instruction, always ask your healthcare professional. [place] [address] any warranty or liability for your use of this information.

## 2021-05-14 NOTE — PROGRESS NOTES
Patient requested not to have vital signs taken at 0200 once plan of care was discussed about how night would go. Patient is being monitored on telemetry. Will obtain vitals at 0600.

## 2021-05-14 NOTE — PROGRESS NOTES
Neurology Progress Note     NAME: Amol Vuong   :  1985   MRN:  [id number]   DATE:  2021    Assessment:     Principal Problem:    Optic neuritis, left (2021)      Pt is a 33yo RH female presenting with left optic neuritis, confirmed on MRI brain, no other white matter lesions seen. No history suggestive of demyelinating disease and other than subtle left APD, exam is non-focal.   MRI C-spine with cord signal change at T3-T4    Probable demyelinating disease, MS vs NMOSD  Plan:   -Continue IV solumedrol 1g daily x 2 more days for a total of 5 days, then steroid taper as outlined  -NMO Ab pending  -F/u in clinic with Dr. Rupesh Burger at next available (Pt will be placed on cancellation list, next available appt is in Sept.)  -Stable for d/c    Subjective:   Pt is doing well. Ready to go home, walking [place] the [place]. Discussed MRI C-spine and possibility of demyelinating disease. Objective:   Chart reviewed since last seen    Current Facility-Administered Medications   Medication Dose Route Frequency    famotidine (PEPCID) tablet 20 mg  20 mg Oral BID    methylprednisoLONE (Solu-MEDROL) 1 gm in 100 ml NS MBP  1,000 mg IntraVENous DAILY    melatonin tablet 3 mg  3 mg Oral QHS PRN    acetaminophen (TYLENOL) tablet 650 mg  650 mg Oral Q4H PRN    Or    acetaminophen (TYLENOL) solution 650 mg  650 mg Per NG tube Q4H PRN    Or    acetaminophen (TYLENOL) suppository 650 mg  650 mg Rectal Q4H PRN    ondansetron (ZOFRAN) injection 4 mg  4 mg IntraVENous Q6H PRN       Visit Vitals  /60   Pulse 61   Temp 98.2 °F (36.8 °C)   Resp 14   Ht 5' 1\" (1.549 m)   Wt 134 lb 11.2 oz (61.1 kg)   SpO2 95%   BMI 25.45 kg/m²     Temp (24hrs), Av.2 °F (36.8 °C), Min:98 °F (36.7 °C), Max:98.4 °F (36.9 °C)      No intake/output data recorded.   1901 -  0700  In: 600 [I.V.:600]  Out: -       Physical Exam:  General: Well developed well nourished patient in no apparent distress. Neurological Exam:  Mental Status: Oriented to time, place and person. Speech and language intact. Attention and fund of knowledge appropriate. Normal recent and remote memory. Cranial Nerves:   EOMI, no nystagmus, no ptosis. Facial movement is symmetric. Hearing is intact. Motor:     Reflexes:      Sensory:      Gait:  Steady routine gait   Cerebellar:           Lab Review No results found for this or any previous visit (from the past 24 hour(s)). Additional comments:  I have reviewed the patient's new clinical lab test results. I have personally reviewed the patient's radiographs. MRI  MRI Results (most recent):  Results from [place] encounter on 05/12/21   MRI CERV SPINE WO CONT    Narrative *PRELIMINARY REPORT*    Suspect plaque at the C3-4 level within cord on left. No acute findings  otherwise. Preliminary report was provided by Dr. Manish Keller, the on-call radiologist, at  18:44    Final report to follow. *END PRELIMINARY REPORT*      EXAM: MRI CERV SPINE WO CONT    INDICATION: Pt with optic neuritis, brisk reflexes, assess for demyelinating  disease. COMPARISON: CT 8/25/2019    TECHNIQUE: MR imaging of the cervical spine was performed using the following  sequences: sagittal T1, T2, STIR;  axial T2, T1.     CONTRAST:  None. FINDINGS:    There is mild reversal of curvature centered at C3. Vertebral body heights are  maintained. Marrow signal is unremarkable. The craniocervical junction is intact. There is an area of indistinct increased  T2/STIR signal in the cord at C3-4. The paraspinal soft tissues are within normal limits. C2-C3: No herniation or stenosis. C3-C4: No herniation or stenosis. C4-C5: No herniation or stenosis. C5-C6: No herniation or stenosis. C6-C7: No herniation or stenosis.      C7-T1: No herniation or stenosis. Impression 1. Area of increased indistinct T2/STIR signal in the cord at T3-4 which could  be related to demyelinating disease. 2. No significant spinal stenosis or neural foraminal narrowing. Care Plan discussed with:  Patient x   Family    RN x   Care Manager    Consultant/Specialist:  melba     Signed: Lu Reyez MD

## 2021-05-14 NOTE — PROGRESS NOTES
Attempted to schedule hospital follow up PCP appointment.  Physician is with [facility] walk-in facility 8:00 a.m. to 10:00 p.m.  Ann Martinez, Care Management Specialist.

## 2021-05-14 NOTE — PROGRESS NOTES
Care Management:    Transition of Care Plan:     · RUR: 5%  · Disposition: Home with IV Solumedrol on 5-15-21 and 5-16-21 at [facility]. · Transportation: boyfriend      Discussed with RN and MD during IDRs. Patient can discharge today if IV Solumedrol can be arranged for the next 2 days. Hospitalist completed orders for infusion center[facility] Their infusion center is only open Monday to Friday. Met with patient in room. Offered choice of infusion centers. She would like to return to [facility] location. Called [facility] location ([phone]). Spoke with [facility]. They can see patient 8:00 AM on 5-15-21 and 10:30 AM on 5-16-21. Faxed orders to them at [phone]. Received fax confirmation.  [facility] will place information on AVS.    Updated patient, RN, and MD.    LIZA Carrasquillo

## 2021-05-14 NOTE — PROGRESS NOTES
Clinical Pharmacy Note: IV to PO Automatic Conversion  Please note: Yoselin Migue medication Pepcid (famotidine) has been changed from IV to PO based on the following critiera:    Patient is taking scheduled oral medications  Patient is tolerating regular diet    This IV to PO conversion is based on the [employer] approved automatic conversion policy for eligible patients. Please call with questions.

## 2021-05-14 NOTE — DISCHARGE SUMMARY
Discharge Summary       PATIENT ID: Natacha Persaud  MRN: [id number]   YOB: 1985    DATE OF ADMISSION: 5/12/2021 11:34 AM    DATE OF DISCHARGE: 05/14/2021   PRIMARY CARE PROVIDER: None     ATTENDING PHYSICIAN: Michael Mahoney  DISCHARGING PROVIDER: Anthony Patel MD    To contact this individual call [phone] and ask the  to page. If unavailable ask to be transferred the Adult Hospitalist Department. CONSULTATIONS: IP CONSULT TO NEUROLOGY  IP CONSULT TO NEUROLOGY    PROCEDURES/SURGERIES: * No surgery found *    ADMITTING [address] [place] [address] COURSE:     HPI  Natacha Persaud is a 30 y.o. female who presents with left eye vision problems.   History was probably obtained from the patient. Patient reports that she started experiencing blurred vision in the left eye associated with eye pain with extraocular movement to the left. Patient reports that she had mild headache associated with her symptoms. Got concerned and went to her ophthalmologist who told her to come to the ER for concern for a central process. Patient reports that she wears glasses off and on but has never had any eye problems. [place] and is concerned about Lyme disease. Patient reports that she was also slightly photophobic. Patient came to the ER and was found to have optic neuritis and was requested to be admitted to the hospital service. Patient denies any other complaints or problems. Patient denies any history of multiple sclerosis    Hospital Course  Patient admitted for further evaluation. MRI shows left optic neuritis involving the intracanalicular and prechiasmatic left optic nerve, patient was started on high-dose Solu-Medrol 1 g daily. Neurology evaluated the patient, ordered NMO [place], pending at the time of discharge. Patient to receive 2 more days of Solu-Medrol 1 g IV daily for the next 2 days at [facility].   Patient will then start on Solu-Medrol taper dose.  Patient to follow-up with neurology for further evaluation as outpatient in about 2 weeks. Of note, patient has history of tick bite and Lyme titer pending at the time of discharge. No high suspicion for acute Lyme disease at this time. Lyme titer can be followed as outpatient. DISCHARGE DIAGNOSES / PLAN:      1. Optic neuritis     ADDITIONAL CARE RECOMMENDATIONS:     Follow-up with neurology in 2 weeks. PENDING TEST RESULTS:   At the time of discharge the following test results are still pending: NMO antibody and Lyme titer    FOLLOW UP APPOINTMENTS:    Follow-up Information     Follow up With Specialties Details Why Contact Info    None    None ([phone]) Patient stated that they have no PCP      Sergio Gutierrez MD Neurology In 2 weeks  [address] [facility]  [phone]               DIET: Regular Diet  Oral Nutritional Supplements: No Oral Supplement prescribed    ACTIVITY: Activity as tolerated    WOUND CARE: None    EQUIPMENT needed: None      DISCHARGE MEDICATIONS:  Current Discharge Medication List      START taking these medications    Details   methylprednisoLONE (Solu-MEDROL) 1 gm in 100 ml NS MBP 1,000 mg by IntraVENous route daily for 2 days. Qty: 1 Each, Refills: 0  Start date: 5/15/2021, End date: 5/17/2021      predniSONE (DELTASONE) 10 mg tablet Take 60 mg daily x2 days, then 40 mg daily x2 days, then 20 mg daily x2 days, and 10 mg daily x2 days and then 5 mg daily x2 days. Qty: 30 Tab, Refills: 0  Start date: 5/14/2021         CONTINUE these medications which have NOT CHANGED    Details   melatonin 3 mg tablet Take 3 mg by mouth nightly as needed for Insomnia. norgestimate-ethinyl estradioL (Sprintec, 28,) 0.25-35 mg-mcg tab Take 1 Tab by mouth daily. therapeutic multivitamin (THERAGRAN) tablet Take 1 Tab by mouth daily. ascorbic acid, vitamin C, (Vitamin C) 500 mg tablet Take 1,000 mg by mouth daily.       B.infantis-B.ani-B.long-B.bifi (Probiotic 4X) 10-15 mg TbEC Take  by mouth daily. acetaminophen (TYLENOL) 325 mg tablet Take 650 mg by mouth every four (4) hours as needed for Pain. ibuprofen (AdviL) 200 mg tablet Take 200 mg by mouth every eight (8) hours as needed for Pain. NOTIFY YOUR PHYSICIAN FOR ANY OF THE FOLLOWING:   Fever over 101 degrees for 24 hours. Chest pain, shortness of breath, fever, chills, nausea, vomiting, diarrhea, change in mentation, falling, weakness, bleeding. Severe pain or pain not relieved by medications. Or, any other signs or symptoms that you may have questions about. DISPOSITION:    Home With:   OT  PT  HH  RN       Long term SNF/Inpatient Rehab    Independent/assisted living    Hospice    Other:       PATIENT CONDITION AT DISCHARGE:     Functional status    Poor     Deconditioned     Independent      Cognition     Lucid     Forgetful     Dementia      Catheters/lines (plus indication)    Saldaña     PICC     PEG     None      Code status     Full code     DNR      PHYSICAL EXAMINATION AT DISCHARGE:  General:          Alert, cooperative, no distress, appears stated age. HEENT:           Atraumatic, anicteric sclerae, pink conjunctivae                          No oral ulcers, mucosa moist, throat clear, dentition fair  Neck:               Supple, symmetrical  Lungs:             Clear to auscultation bilaterally. No Wheezing or Rhonchi. No rales. Chest wall:      No tenderness  No Accessory muscle use. Heart:              Regular  rhythm,  No  murmur   No edema  Abdomen:        Soft, non-tender. Not distended. Bowel sounds normal  Extremities:     No cyanosis. No clubbing,                            Skin turgor normal, Capillary refill normal  Skin:                Not pale. Not Jaundiced  No rashes   Psych:             Not anxious or agitated.   Neurologic:      Alert, moves all extremities, answers questions appropriately and responds to commands       [address] DIAGNOSES:  Problem List as of 5/14/2021 Never Reviewed          Codes Class Noted - Resolved    * (Principal) Optic neuritis, left ICD-10-CM: H46.9  ICD-9-CM: 377.30  5/13/2021 - Present              Greater than 60 minutes were spent with the patient on counseling and coordination of care    Signed:   Deepa Bran MD  5/14/2021  2:29 PM

## 2021-05-14 NOTE — PROGRESS NOTES
Problem: Falls - Risk of  Goal: *Absence of Falls  Description: Document Onalee Gum Fall Risk and appropriate interventions in the flowsheet.   Outcome: Progressing Towards Goal  Note: Fall Risk Interventions:            Medication Interventions: Evaluate medications/consider consulting pharmacy, Patient to call before getting OOB, Teach patient to arise slowly

## 2021-05-14 NOTE — CONSULTS
[address]    Name:  Mike Domínguez  MR#:  [id number]  :  1985  ACCOUNT #:  [de-identified]  DATE OF SERVICE:  2021    NEUROLOGY CONSULTATION    HISTORY OF PRESENT ILLNESS:  This is a 35-year right-handed female, who was admitted yesterday on referral by her ophthalmologist due to left optic neuritis. The patient reports onset of symptoms three weeks ago that she felt were consistent with a UTI, was treated with antibiotics, but subsequently developed shooting headaches. Twelve days later, she still has symptoms of urinary frequency and painful urination. She returned for evaluation. She was noted to have elevated white count on her UA, but cultures were negative. She was treated for bacterial vaginosis and again had headaches. She has also noticed a swollen lymph node on her right neck and low grade fevers of 99.8. Then, on Thursday of last week, she developed blurry vision, which gradually worsened, her vision started to darkened, and by 05/10/2021, she had very poor vision in her left eye and went to see her ophthalmologist at [facility], was told she need to see a specialist there and saw Dr. Selene Ambriz yesterday who felt she had left optic neuritis and sent her to the ED for MRI and evaluation. MRI of the brain with and without contrast does show findings consistent with left optic neuritis involving the intracanalicular and prechiasmatic left optic nerve. No changes on the right. No other white matter lesions seen. 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  The patient denies any prior history of neurological deficits. Denies any history of autoimmune disease. She has a brother, who was tested for lupus due to his skin disease, but she is not certain exactly what he has.   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 PAST MEDICAL HISTORY:  None. REVIEW OF SYSTEMS:  Per past medical history and HPI, otherwise, reviewed and negative. MEDICATIONS AT HOME:  1. Oral contraceptive pills. 2.  Multivitamins. 3.  Vitamin C.  4.  Probiotics. 5.  Tylenol. ALLERGIES:  NONE. SOCIAL HISTORY:  She lives in [place] with her boyfriend and her two children and his child. 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 She denies any tobacco, alcohol, or drug use. FAMILY HISTORY:  Dad with heart disease with history of CABG and hypertension. Mom with diabetes and hypertension. PHYSICAL EXAMINATION:  VITAL SIGNS:  Blood pressure currently 81/63, pulse 85, respiratory rate 15, saturating 97% on room air, temperature is 98.1, BMI of 24.6. GENERAL:  She is a well-nourished, well-developed, healthy-appearing female, walking around the room, in no distress. HEART:  Regular rate and rhythm without murmurs. CAROTIDS: 2+. No bruits. EXTREMITIES:  Warm. No edema. 2+ radial pulses. NEUROLOGIC EXAMINATION:  Mental status, alert and oriented x4. Speech and language intact. Attention, memory, and fund of knowledge appropriate. Cranial nerve exam, no facial asymmetry or ptosis. Extraocular eye movements are intact without diplopia or nystagmus. Visual fields full in the right eye. In the left eye, she has difficulty with left lower quadrant, but is able to state the correct number of fingers. Pupils are both round and reactive with very subtle left APD seen. Strength, sensation, and hearing intact. Tongue is midline. Palate elevates symmetrically. Trapezius and sternocleidomastoid are 5/5. Motor exam is 5/5 throughout. No pronator drift. No tremors. Sensory exam is intact to light touch and pinprick throughout. Reflexes, the patient has brisk, but symmetric reflexes. No pathological reflexes seen. Toes downgoing. Gait, she has a steady routine gait. LABORATORY DATA:  Studies and Reports:  MRI reviewed as noted above in the HPI. CBC at presentation was unremarkable. CMP unremarkable. Sed rate 2.  C-reactive protein 0.32. Pregnancy test negative. UA negative. TSH 0.60. CRP repeated is 2.6, hemoglobin A1c 4.9. CBC this morning normal, sed rate 10. Lipase 170, .8 with an HDL of 67. Magnesium and phosphorus are normal.  Lyme antibodies are pending. ASSESSMENT AND PLAN:  This is a 72-year-old right-handed female, presenting with left optic neuritis with MRI of the brain confirming this. No other white matter lesions seen. No history suggestive of demyelinating disease. Exam with subtle left APD, brisk reflexes, otherwise nonfocal.  Recommend MRI of the C-spine to assess for demyelinating disease of cord. NMO antibodies ordered. The patient has been started on IV Solu-Medrol and received 1000 mg daily x5 days, today is day 2 of 5, followed by a prednisone taper of 60 mg daily x2 days, then 40 mg daily x2 days, then 20 mg daily x2 days, and 10 mg daily x2 days and then 5 mg daily x2 days. The patient could potentially be discharged tomorrow if her remaining IV Solu-Medrol doses could be set up as an outpatient.       MD PATSY Prescott/[id number]  D:  05/13/2021 21:59  T:  05/13/2021 23:46  JOB #:  [id number]

## 2021-05-15 ENCOUNTER — HOSPITAL ENCOUNTER (OUTPATIENT)
Dept: INFUSION THERAPY | Age: 36
Discharge: HOME | End: 2021-05-15
Payer: COMMERCIAL

## 2021-05-15 VITALS
TEMPERATURE: 97.3 F | RESPIRATION RATE: 16 BRPM | HEART RATE: 55 BPM | DIASTOLIC BLOOD PRESSURE: 77 MMHG | SYSTOLIC BLOOD PRESSURE: 122 MMHG

## 2021-05-15 LAB
B BURGDOR IGG+IGM SER IA-IMP: NORMAL
B BURGDOR IGG+IGM SER IA-IMP: NORMAL
B BURGDOR IGG+IGM SER-ACNC: <0.91 ISR (ref 0–0.9)

## 2021-05-15 PROCEDURE — 74011000258 HC RX REV CODE- 258: Performed by: FAMILY MEDICINE

## 2021-05-15 PROCEDURE — 74011250636 HC RX REV CODE- 250/636: Performed by: FAMILY MEDICINE

## 2021-05-15 PROCEDURE — 96365 THER/PROPH/DIAG IV INF INIT: CPT

## 2021-05-15 RX ORDER — SODIUM CHLORIDE 9 MG/ML
25 INJECTION, SOLUTION INTRAVENOUS AS NEEDED
Status: DISCONTINUED | OUTPATIENT
Start: 2021-05-15 | End: 2021-05-16 | Stop reason: HOSPADM

## 2021-05-15 RX ADMIN — METHYLPREDNISOLONE SODIUM SUCCINATE 1000 MG: 1 INJECTION, POWDER, LYOPHILIZED, FOR SOLUTION INTRAMUSCULAR; INTRAVENOUS at 08:11

## 2021-05-15 RX ADMIN — SODIUM CHLORIDE 25 ML/HR: 900 INJECTION, SOLUTION INTRAVENOUS at 08:11

## 2021-05-15 NOTE — ROUTINE PROCESS
Bedside shift change report given to [facility] (oncoming nurse) by Derrek Fitch RN (offgoing nurse). Report included the following information SBAR, Kardex, ED Summary, Intake/Output, MAR, Cardiac Rhythm NSR and Dual Neuro Assessment.

## 2021-05-15 NOTE — PROGRESS NOTES
OPIC Short Note                   0800 Pt admit to [facility] for Solumedrol ambulatory in stable condition. Assessment completed. No new concerns voiced. Patient denies SOB, fever, cough, and/or general not feeling well. Patient denies recent exposure to someone who has tested positive for COVID-19. Patient denies contact with anyone who has a pending COVID test. PIV established in [facility] [place] by [facility] with good blood return. PIV connected to NS KVO. Patient Vitals for the past 12 hrs:   Temp Pulse Resp BP   05/15/21 0930  (!) 55  122/77   05/15/21 0807 97.3 °F (36.3 °C) 85 16 118/73       Medications Administered     0.9% sodium chloride infusion     Admin Date  05/15/2021 Action  New Bag Dose  25 mL/hr Rate  25 mL/hr Route  IntraVENous Administered By  Fariba Carbajal RN          methylprednisoLONE (Solu-MEDROL) 1 gm in 100 ml NS MBP     Admin Date  05/15/2021 Action  New Bag Dose  1,000 mg Rate  100 mL/hr Route  IntraVENous Administered By  Fariba Carbajal RN              Ms. Azeb Roberts tolerated the infusion, and had no complaints. Line flushed following infusion. PIV removed without difficulty. Arm wrapped with 2x2 and coban. Ms. Azeb Roberts was discharged from [place] [address] in stable condition at 0930. Patient is aware of appointment tomorrow.     Future Appointments   Date Time Provider Marii Quarles   5/16/2021 10:30 AM 2 Rehab Óscar Yen RN  May 15, 2021  9:21 AM

## 2021-05-16 ENCOUNTER — HOSPITAL ENCOUNTER (OUTPATIENT)
Dept: INFUSION THERAPY | Age: 36
Discharge: HOME | End: 2021-05-16
Payer: COMMERCIAL

## 2021-05-16 VITALS
HEART RATE: 53 BPM | TEMPERATURE: 97.7 F | SYSTOLIC BLOOD PRESSURE: 121 MMHG | DIASTOLIC BLOOD PRESSURE: 71 MMHG | RESPIRATION RATE: 18 BRPM

## 2021-05-16 PROCEDURE — 74011250636 HC RX REV CODE- 250/636: Performed by: FAMILY MEDICINE

## 2021-05-16 PROCEDURE — 74011000258 HC RX REV CODE- 258: Performed by: FAMILY MEDICINE

## 2021-05-16 PROCEDURE — 96365 THER/PROPH/DIAG IV INF INIT: CPT

## 2021-05-16 RX ORDER — SODIUM CHLORIDE 9 MG/ML
25 INJECTION, SOLUTION INTRAVENOUS AS NEEDED
Status: DISCONTINUED | OUTPATIENT
Start: 2021-05-16 | End: 2021-05-17 | Stop reason: HOSPADM

## 2021-05-16 RX ADMIN — SODIUM CHLORIDE 25 ML/HR: 900 INJECTION, SOLUTION INTRAVENOUS at 10:30

## 2021-05-16 RX ADMIN — METHYLPREDNISOLONE SODIUM SUCCINATE 1000 MG: 1 INJECTION, POWDER, LYOPHILIZED, FOR SOLUTION INTRAMUSCULAR; INTRAVENOUS at 10:32

## 2021-05-16 NOTE — PROGRESS NOTES
OPIC Progress Note    Date: May 16, 2021        1025: Pt arrived ambulatory to [facility] for Solumedrol  in stable condition. Assessment completed. Continued headaches in the morning. Insomnia. PIV placed to right AC with positive blood return. Patient denies any symptoms of COVID-19, including SOB, coughing, fever, or generally not feeling well. Patient denies any recent exposure to COVID-19. Patient denies any recent contact with family or friends that have a pending COVID-19 test.      Patient Vitals for the past 12 hrs:   Temp Pulse Resp BP   05/16/21 1026 97.7 °F (36.5 °C) (!) 53 18 121/71       Medications Administered     0.9% sodium chloride infusion     Admin Date  05/16/2021 Action  New Bag Dose  25 mL/hr Rate  25 mL/hr Route  IntraVENous Administered By  Gerald Malhotra          methylprednisoLONE (Solu-MEDROL) 1 gm in 100 ml NS MBP     Admin Date  05/16/2021 Action  New Bag Dose  1,000 mg Rate  100 mL/hr Route  IntraVENous Administered By  Gerald Malhotra                  1155: Tolerated treatment well, no adverse reactions noted. PIV flushed and removed. 2x2 and coban placed. D/Cd from [facility] ambulatory and in no distress.          Shobha Lopez RN  May 16, 2021

## 2021-05-17 ENCOUNTER — TELEPHONE (OUTPATIENT)
Dept: NEUROLOGY | Age: 36
End: 2021-05-17

## 2021-05-17 LAB — AQP4 H2O CHANNEL AB SERPL IA-ACNC: <1.5 U/ML (ref 0–3)

## 2021-05-19 ENCOUNTER — OFFICE VISIT (OUTPATIENT)
Dept: NEUROLOGY | Age: 36
End: 2021-05-19
Payer: COMMERCIAL

## 2021-05-19 VITALS
WEIGHT: 134.2 LBS | SYSTOLIC BLOOD PRESSURE: 130 MMHG | OXYGEN SATURATION: 98 % | DIASTOLIC BLOOD PRESSURE: 80 MMHG | HEART RATE: 62 BPM | HEIGHT: 61 IN | BODY MASS INDEX: 25.34 KG/M2

## 2021-05-19 DIAGNOSIS — R90.89 ABNORMAL MRI, SPINAL CORD: ICD-10-CM

## 2021-05-19 DIAGNOSIS — H46.9 OPTIC NEURITIS, LEFT: Primary | ICD-10-CM

## 2021-05-19 PROCEDURE — 99214 OFFICE O/P EST MOD 30 MIN: CPT | Performed by: PSYCHIATRY & NEUROLOGY

## 2021-05-19 NOTE — PROGRESS NOTES
Neurology Consult Note      HISTORY PROVIDED BY: patient and boyfriend    Chief Complaint:   Chief Complaint   Patient presents with   [facility] Follow Up      Subjective:    Willow Kaplan is a 30 y.o. right handed female initially and last seen while hospitalized 21 - 21 after presenting with left optic neuritis, confirmed on MRI brain, no other white matter lesions seen. MRI C-spine with subtle cord signal change at C3-C4. No history suggestive of demyelinating disease and other than subtle left APD, exam was non-focal. She received IV solumedrol 1g daily x 5 days, then steroid taper. NMO Ab were negative. She returns for f/u. Her vision has improved, still feels like she is looking through a screen. Her VF testing has improved markedly, has results with her. She was measured to have 20/25 vision in left eye. No new sxs. She is on prednisone 40mg daily, feels fatigued, but no other side effects. Has noticed a rush of nausea and palpitations, flushed red, after she takes it, had this in the hospital with IV Solumedrol but much worse. MRI's reviewed with pt. No past medical history on file.    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 [employer]   Tobacco Use    Smoking status: Never Smoker    Smokeless tobacco: Never Used   Substance and Sexual Activity    Alcohol use: Not Currently    Drug use: Not Currently    Sexual activity: Not on file   Other Topics Concern    Not on file   Social History Narrative    Lives with boyfriend and their children in [place] [address] Strain:     Difficulty of Paying Living Expenses:    Food Insecurity:     Worried About Running Out of Food in the Last Year:     [address] in the Last Year:    Transportation Needs:     Lack of Transportation (Medical):  Lack of Transportation (Non-Medical):    Physical Activity:     Days of Exercise per Week:     Minutes of Exercise per Session:    Stress:     Feeling of Stress :    Social Connections:     Frequency of Communication with Friends and Family:     Frequency of Social Gatherings with Friends and Family:     Attends Bahai Services:     Active Member of Clubs or Organizations:     Attends Club or Organization Meetings:     Marital Status:    Intimate Partner Violence:     Fear of Current or Ex-Partner:     Emotionally Abused:     Physically Abused:     Sexually Abused:      Family History   Problem Relation Age of Onset    Hypertension Mother     Diabetes Mother     Hypertension Father     Coronary Artery Disease Father          Objective:   Review of Systems   Eyes: Positive for blurred vision. Neurological: Positive for headaches. Psychiatric/Behavioral: The patient is nervous/anxious. All other systems reviewed and are negative. No Known Allergies     Meds:  Outpatient Medications Prior to Visit   Medication Sig Dispense Refill    predniSONE (DELTASONE) 10 mg tablet Take 60 mg daily x2 days, then 40 mg daily x2 days, then 20 mg daily x2 days, and 10 mg daily x2 days and then 5 mg daily x2 days. 30 Tab 0    norgestimate-ethinyl estradioL (Sprintec, 28,) 0.25-35 mg-mcg tab Take 1 Tab by mouth daily.  therapeutic multivitamin (THERAGRAN) tablet Take 1 Tab by mouth daily.  ascorbic acid, vitamin C, (Vitamin C) 500 mg tablet Take 1,000 mg by mouth daily.  B.infantis-B.ani-B.long-B.bifi (Probiotic 4X) 10-15 mg TbEC Take  by mouth daily.  melatonin 3 mg tablet Take 3 mg by mouth nightly as needed for Insomnia.  (Patient not taking: Reported on 5/19/2021)      acetaminophen (TYLENOL) 325 mg tablet Take 650 mg by mouth every four (4) hours as needed for Pain. (Patient not taking: Reported on 5/19/2021)      ibuprofen (AdviL) 200 mg tablet Take 200 mg by mouth every eight (8) hours as needed for Pain. (Patient not taking: Reported on 5/19/2021)       No facility-administered medications prior to visit. Imaging:  MRI Results (most recent):  Results from Hospital Encounter encounter on 05/12/21    MRI CERV SPINE WO CONT    Narrative  *PRELIMINARY REPORT*    Suspect plaque at the C3-4 level within cord on left. No acute findings  otherwise. Preliminary report was provided by Dr. Misha Gross, the on-call radiologist, at  18:44    Final report to follow. *END PRELIMINARY REPORT*      EXAM: MRI CERV SPINE WO CONT    INDICATION: Pt with optic neuritis, brisk reflexes, assess for demyelinating  disease. COMPARISON: CT 8/25/2019    TECHNIQUE: MR imaging of the cervical spine was performed using the following  sequences: sagittal T1, T2, STIR;  axial T2, T1.    CONTRAST:  None. FINDINGS:    There is mild reversal of curvature centered at C3. Vertebral body heights are  maintained. Marrow signal is unremarkable. The craniocervical junction is intact. There is an area of indistinct increased  T2/STIR signal in the cord at C3-4. The paraspinal soft tissues are within normal limits. C2-C3: No herniation or stenosis. C3-C4: No herniation or stenosis. C4-C5: No herniation or stenosis. C5-C6: No herniation or stenosis. C6-C7: No herniation or stenosis. C7-T1: No herniation or stenosis. Impression  1. Area of increased indistinct T2/STIR signal in the cord at T3-4 which could  be related to demyelinating disease. 2. No significant spinal stenosis or neural foraminal narrowing. CT Results (most recent):  No results found for this or any previous visit.        Reviewed records in [facility] and [facility] tab today    Lab Review Results for orders placed or performed during the hospital encounter of 05/12/21   URINE CULTURE HOLD SAMPLE    Specimen: Serum; Urine   Result Value Ref Range    Urine culture hold        Urine on hold in Microbiology dept for 2 days. If unpreserved urine is submitted, it cannot be used for addtional testing after 24 hours, recollection will be required. URINALYSIS W/MICROSCOPIC   Result Value Ref Range    Color YELLOW/STRAW      Appearance CLEAR CLEAR      Specific gravity 1.009 1.003 - 1.030      pH (UA) 7.0 5.0 - 8.0      Protein Negative NEG mg/dL    Glucose Negative NEG mg/dL    Ketone Negative NEG mg/dL    Bilirubin Negative NEG      Blood Negative NEG      Urobilinogen 0.2 0.2 - 1.0 EU/dL    Nitrites Negative NEG      Leukocyte Esterase Negative NEG      WBC 0-4 0 - 4 /hpf    RBC 0-5 0 - 5 /hpf    Epithelial cells FEW FEW /lpf    Bacteria Negative NEG /hpf    Hyaline cast 0-2 0 - 5 /lpf   CBC WITH AUTOMATED DIFF   Result Value Ref Range    WBC 6.3 3.6 - 11.0 K/uL    RBC 4.62 3.80 - 5.20 M/uL    HGB 13.9 11.5 - 16.0 g/dL    HCT 42.1 35.0 - 47.0 %    MCV 91.1 80.0 - 99.0 FL    MCH 30.1 26.0 - 34.0 PG    MCHC 33.0 30.0 - 36.5 g/dL    RDW 12.5 11.5 - 14.5 %    PLATELET 936 770 - 038 K/uL    MPV 10.2 8.9 - 12.9 FL    NRBC 0.0 0  WBC    ABSOLUTE NRBC 0.00 0.00 - 0.01 K/uL    NEUTROPHILS 64 32 - 75 %    LYMPHOCYTES 28 12 - 49 %    MONOCYTES 5 5 - 13 %    EOSINOPHILS 2 0 - 7 %    BASOPHILS 1 0 - 1 %    IMMATURE GRANULOCYTES 0 0.0 - 0.5 %    ABS. NEUTROPHILS 4.0 1.8 - 8.0 K/UL    ABS. LYMPHOCYTES 1.8 0.8 - 3.5 K/UL    ABS. MONOCYTES 0.3 0.0 - 1.0 K/UL    ABS. EOSINOPHILS 0.1 0.0 - 0.4 K/UL    ABS. BASOPHILS 0.0 0.0 - 0.1 K/UL    ABS. IMM.  GRANS. 0.0 0.00 - 0.04 K/UL    DF AUTOMATED     METABOLIC PANEL, COMPREHENSIVE   Result Value Ref Range    Sodium 138 136 - 145 mmol/L    Potassium 4.0 3.5 - 5.1 mmol/L    Chloride 106 97 - 108 mmol/L    CO2 27 21 - 32 mmol/L    Anion gap 5 5 - 15 mmol/L    Glucose 86 65 - 100 mg/dL    BUN 8 6 - 20 MG/DL    Creatinine 0.66 0.55 - 1.02 MG/DL    BUN/Creatinine ratio 12 12 - 20      GFR est AA >60 >60 ml/min/1.73m2    GFR est non-AA >60 >60 ml/min/1.73m2    Calcium 9.3 8.5 - 10.1 MG/DL    Bilirubin, total 0.5 0.2 - 1.0 MG/DL    ALT (SGPT) 37 12 - 78 U/L    AST (SGOT) 24 15 - 37 U/L    Alk. phosphatase 47 45 - 117 U/L    Protein, total 8.1 6.4 - 8.2 g/dL    Albumin 3.8 3.5 - 5.0 g/dL    Globulin 4.3 (H) 2.0 - 4.0 g/dL    A-G Ratio 0.9 (L) 1.1 - 2.2     SED RATE (ESR)   Result Value Ref Range    Sed rate, automated 2 0 - 20 mm/hr   C REACTIVE PROTEIN, QT   Result Value Ref Range    C-Reactive protein 0.32 0.00 - 0.60 mg/dL   SAMPLES BEING HELD   Result Value Ref Range    SAMPLES BEING HELD 1RED,1BLU     COMMENT        Add-on orders for these samples will be processed based on acceptable specimen integrity and analyte stability, which may vary by analyte.    CK   Result Value Ref Range    CK 54 26 - 192 U/L   HEMOGLOBIN A1C WITH EAG   Result Value Ref Range    Hemoglobin A1c 4.9 4.0 - 5.6 %    Est. average glucose 94 mg/dL   CBC W/O DIFF   Result Value Ref Range    WBC 6.6 3.6 - 11.0 K/uL    RBC 4.48 3.80 - 5.20 M/uL    HGB 13.5 11.5 - 16.0 g/dL    HCT 40.3 35.0 - 47.0 %    MCV 90.0 80.0 - 99.0 FL    MCH 30.1 26.0 - 34.0 PG    MCHC 33.5 30.0 - 36.5 g/dL    RDW 12.2 11.5 - 14.5 %    PLATELET 762 689 - 832 K/uL    MPV 10.0 8.9 - 12.9 FL    NRBC 0.0 0  WBC    ABSOLUTE NRBC 0.00 0.00 - 0.01 K/uL   TSH 3RD GENERATION   Result Value Ref Range    TSH 0.60 0.36 - 3.74 uIU/mL   SED RATE (ESR)   Result Value Ref Range    Sed rate, automated 10 0 - 20 mm/hr   CRP, HIGH SENSITIVITY   Result Value Ref Range    CRP, High sensitivity 2.6 mg/L   LIPASE   Result Value Ref Range    Lipase 170 73 - 393 U/L   LIPID PANEL   Result Value Ref Range    Cholesterol, total 224 (H) <200 MG/DL    Triglyceride 91 <150 MG/DL    HDL Cholesterol 67 MG/DL    LDL, calculated 138.8 (H) 0 - 100 MG/DL    VLDL, calculated 18.2 MG/DL    CHOL/HDL Ratio 3.3 0.0 - 5.0     MAGNESIUM   Result Value Ref Range    Magnesium 2.1 1.6 - 2.4 mg/dL   PHOSPHORUS   Result Value Ref Range    Phosphorus 4.0 2.6 - 4.7 MG/DL   LYME AB TOTAL W/RFLX W BLOT   Result Value Ref Range    Lyme Ab, IgG/IgM <0.91 0.00 - 0.90 ISR    Lyme Ab Interp. ,EIA <<DO NOT REPORT>>      Lyme Ab Interp. ,EIA <<DO NOT REPORT>>     AQUAPORIN-4 RECEPTOR AB   Result Value Ref Range    NMO IgG <1.5 0.0 - 3.0 U/mL   HCG URINE, QL. - POC   Result Value Ref Range    Pregnancy test,urine (POC) Negative NEG          Exam:  Visit Vitals  /80   Pulse 62   Ht 5' 1\" (1.549 m)   Wt 134 lb 3.2 oz (60.9 kg)   SpO2 98%   BMI 25.36 kg/m²     General:  Alert, cooperative, no distress. Head:  Normocephalic, without obvious abnormality, atraumatic. Respiratory:  Heart:   Non labored breathing     Neck:      Extremities:    Pulses:        Neurologic:  MS: Alert and oriented x 4, speech intact. Language intact. Attention and fund of knowledge appropriate. Recent and remote memory intact. Cranial Nerves:  II: visual fields    II: pupils    II: optic disc    III,VII: ptosis none   III,IV,VI: extraocular muscles  EOMI, no nystagmus or diplopia   V: facial light touch sensation     VII: facial muscle function   symmetric   VIII: hearing intact   IX: soft palate elevation     XI: trapezius strength     XI: sternocleidomastoid strength    XII: tongue       Motor:   Sensory:   Coordination:   Gait: normal gait  Reflexes:            Assessment/Plan   Pt is a 30 y.o. right handed female hospitalized 5/12/21 - 5/14/21 after presenting with left optic neuritis, confirmed on MRI brain, no other white matter lesions seen. MRI C-spine with subtle cord signal change at C3-C4. No history suggestive of demyelinating disease and other than subtle left APD, exam was non-focal. S/p IV solumedrol 1g daily x 5 days, currently on steroid taper. NMO Ab were negative.  Discussed MRI C-spine findings, abnormality is very indistinct. Recommend repeat MRI brain and C-spine without contrast in 6 months. Discussed MS, diagnosis, and signs/sxs of demyelinating disease. Currently, only with left ON. F/u in clinic in 6 months, after MRI's completed. Instructed to call in the interim if needed. ICD-10-CM ICD-9-CM    1. Optic neuritis, left  H46.9 377.30 MRI BRAIN WO CONT      MRI CERV SPINE WO CONT   2. Abnormal MRI, spinal cord  R90.89 793.99 MRI BRAIN WO CONT      MRI CERV SPINE WO CONT     I spent a total of 35 minutes in both face-to-face activities and non-face-to-face activities for the visit on the date of this encounter. Signed:   Solo Young MD  5/19/2021

## 2021-11-17 ENCOUNTER — HOSPITAL ENCOUNTER (OUTPATIENT)
Dept: MRI IMAGING | Age: 36
Discharge: HOME | End: 2021-11-17
Attending: PSYCHIATRY & NEUROLOGY
Payer: COMMERCIAL

## 2021-11-17 DIAGNOSIS — R90.89 ABNORMAL MRI, SPINAL CORD: ICD-10-CM

## 2021-11-17 DIAGNOSIS — H46.9 OPTIC NEURITIS, LEFT: ICD-10-CM

## 2021-11-17 PROCEDURE — 72141 MRI NECK SPINE W/O DYE: CPT

## 2021-11-17 PROCEDURE — 70551 MRI BRAIN STEM W/O DYE: CPT

## 2021-11-18 ENCOUNTER — OFFICE VISIT (OUTPATIENT)
Dept: NEUROLOGY | Age: 36
End: 2021-11-18
Payer: COMMERCIAL

## 2021-11-18 VITALS
HEART RATE: 81 BPM | OXYGEN SATURATION: 98 % | HEIGHT: 61 IN | SYSTOLIC BLOOD PRESSURE: 110 MMHG | DIASTOLIC BLOOD PRESSURE: 60 MMHG | BODY MASS INDEX: 25.3 KG/M2 | WEIGHT: 134 LBS

## 2021-11-18 DIAGNOSIS — H46.9 OPTIC NEURITIS, LEFT: Primary | ICD-10-CM

## 2021-11-18 DIAGNOSIS — R90.89 ABNORMAL MRI, SPINAL CORD: ICD-10-CM

## 2021-11-18 PROCEDURE — 99214 OFFICE O/P EST MOD 30 MIN: CPT | Performed by: PSYCHIATRY & NEUROLOGY

## 2021-11-18 NOTE — PROGRESS NOTES
Neurology Consult Note      HISTORY PROVIDED BY: patient and boyfriend    Chief Complaint:   Chief Complaint   Patient presents with    Follow-up    Neurologic Problem      Subjective:   Pt is a 29 y.o. right handed female last seen in clinic on 21, hospitalized 21 - 21 after presenting with left optic neuritis, confirmed on MRI brain, no other white matter lesions seen. MRI C-spine with subtle cord signal change at C3-C4. No history suggestive of demyelinating disease and other than subtle left APD, exam was non-focal. S/p IV solumedrol 1g daily x 5 days, currently on steroid taper. NMO Ab were negative. Discussed MRI C-spine findings, abnormality is very indistinct. Recommended repeat MRI brain and C-spine without contrast in 6 months. Discussed MS, diagnosis, and signs/sxs of demyelinating disease. Currently, only with left ON. She returns for f/u. She had repeat MRI brain wo contrast 21 which is normal. MRI C-spine wo contrast 21 also normal, previous finding felt to represent artifact vs cord edema which has resolved. She has noticed when she is working out her left eye will feel funny and may get slight blurred area in vision, resolves once she stops working out. No new symptoms or complaints. No new medical issues.      Past Medical History:   Diagnosis Date    Optic neuritis, left 2021      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   Tobacco Use    Smoking status: Never Smoker    Smokeless tobacco: Never Used   Substance and Sexual Activity    Alcohol use: Not Currently    Drug use: Not Currently    Sexual activity: Not on file   Other Topics Concern    Not on file   Social History Narrative    Lives with boyfriend and their children in [place] [address] Strain:     Difficulty of Paying Living Expenses: Not on file   Food Insecurity:     Worried About Running Out of Food in the Last Year: Not on file    Niru of Food in the Last Year: Not on file   Transportation Needs:     Lack of Transportation (Medical): Not on file    Lack of Transportation (Non-Medical): Not on file   Physical Activity:     Days of Exercise per Week: Not on file    Minutes of Exercise per Session: Not on file   Stress:     Feeling of Stress : Not on file   Social Connections:     Frequency of Communication with Friends and Family: Not on file    Frequency of Social Gatherings with Friends and Family: Not on file    Attends Gnosticism Services: Not on file    Active Member of [address] [place] [address] [employer] or Organizations: Not on file    Attends Club or Organization Meetings: Not on file    Marital Status: Not on file   Intimate Partner Violence:     Fear of Current or Ex-Partner: Not on file    Emotionally Abused: Not on file    Physically Abused: Not on file    Sexually Abused: Not on file   Housing Stability:     Unable to Pay for Housing in the Last Year: Not on file    Number of [place] in the Last Year: Not on file    Unstable Housing in the Last Year: Not on file     Family History   Problem Relation Age of Onset    Hypertension Mother     Diabetes Mother     Hypertension Father     Coronary Art Dis Father          Objective:   ROS: Per HPI o/w neg    No Known Allergies     Meds:  Outpatient Medications Prior to Visit   Medication Sig Dispense Refill    norgestimate-ethinyl estradioL ([address] [place] [address],) 0.25-35 mg-mcg tab Take 1 Tab by mouth daily.  therapeutic multivitamin (THERAGRAN) tablet Take 1 Tab by mouth daily.       ascorbic acid, vitamin C, (Vitamin C) 500 mg tablet Take 1,000 mg by mouth daily.  B.infantis-B.ani-B.long-B.bifi (Probiotic 4X) 10-15 mg TbEC Take  by mouth daily.  predniSONE (DELTASONE) 10 mg tablet Take 60 mg daily x2 days, then 40 mg daily x2 days, then 20 mg daily x2 days, and 10 mg daily x2 days and then 5 mg daily x2 days. (Patient not taking: Reported on 11/18/2021) 30 Tab 0    melatonin 3 mg tablet Take 3 mg by mouth nightly as needed for Insomnia. (Patient not taking: Reported on 5/19/2021)      acetaminophen (TYLENOL) 325 mg tablet Take 650 mg by mouth every four (4) hours as needed for Pain. (Patient not taking: Reported on 5/19/2021)      ibuprofen (AdviL) 200 mg tablet Take 200 mg by mouth every eight (8) hours as needed for Pain. (Patient not taking: Reported on 5/19/2021)       No facility-administered medications prior to visit. Imaging:  MRI Results (most recent):  Results from Hospital Encounter encounter on 05/12/21    MRI CERV SPINE WO CONT    Narrative  *PRELIMINARY REPORT*    Suspect plaque at the C3-4 level within cord on left. No acute findings  otherwise. Preliminary report was provided by Dr. Brian Park, the on-call radiologist, at  18:44    Final report to follow. *END PRELIMINARY REPORT*      EXAM: MRI CERV SPINE WO CONT    INDICATION: Pt with optic neuritis, brisk reflexes, assess for demyelinating  disease. COMPARISON: CT 8/25/2019    TECHNIQUE: MR imaging of the cervical spine was performed using the following  sequences: sagittal T1, T2, STIR;  axial T2, T1.    CONTRAST:  None. FINDINGS:    There is mild reversal of curvature centered at C3. Vertebral body heights are  maintained. Marrow signal is unremarkable. The craniocervical junction is intact. There is an area of indistinct increased  T2/STIR signal in the cord at C3-4. The paraspinal soft tissues are within normal limits. C2-C3: No herniation or stenosis. C3-C4: No herniation or stenosis.     C4-C5: No herniation or stenosis. C5-C6: No herniation or stenosis. C6-C7: No herniation or stenosis. C7-T1: No herniation or stenosis. Impression  1. Area of increased indistinct T2/STIR signal in the cord at T3-4 which could  be related to demyelinating disease. 2. No significant spinal stenosis or neural foraminal narrowing. CT Results (most recent):  No results found for this or any previous visit. Reviewed records in [facility] tab today    Lab Review   Results for orders placed or performed during the hospital encounter of 05/12/21   URINE CULTURE HOLD SAMPLE    Specimen: Serum; Urine   Result Value Ref Range    Urine culture hold        Urine on hold in Microbiology dept for 2 days. If unpreserved urine is submitted, it cannot be used for addtional testing after 24 hours, recollection will be required.    URINALYSIS W/MICROSCOPIC   Result Value Ref Range    Color YELLOW/STRAW      Appearance CLEAR CLEAR      Specific gravity 1.009 1.003 - 1.030      pH (UA) 7.0 5.0 - 8.0      Protein Negative NEG mg/dL    Glucose Negative NEG mg/dL    Ketone Negative NEG mg/dL    Bilirubin Negative NEG      Blood Negative NEG      Urobilinogen 0.2 0.2 - 1.0 EU/dL    Nitrites Negative NEG      Leukocyte Esterase Negative NEG      WBC 0-4 0 - 4 /hpf    RBC 0-5 0 - 5 /hpf    Epithelial cells FEW FEW /lpf    Bacteria Negative NEG /hpf    Hyaline cast 0-2 0 - 5 /lpf   CBC WITH AUTOMATED DIFF   Result Value Ref Range    WBC 6.3 3.6 - 11.0 K/uL    RBC 4.62 3.80 - 5.20 M/uL    HGB 13.9 11.5 - 16.0 g/dL    HCT 42.1 35.0 - 47.0 %    MCV 91.1 80.0 - 99.0 FL    MCH 30.1 26.0 - 34.0 PG    MCHC 33.0 30.0 - 36.5 g/dL    RDW 12.5 11.5 - 14.5 %    PLATELET 271 719 - 996 K/uL    MPV 10.2 8.9 - 12.9 FL    NRBC 0.0 0  WBC    ABSOLUTE NRBC 0.00 0.00 - 0.01 K/uL    NEUTROPHILS 64 32 - 75 %    LYMPHOCYTES 28 12 - 49 %    MONOCYTES 5 5 - 13 %    EOSINOPHILS 2 0 - 7 %    BASOPHILS 1 0 - 1 %    IMMATURE GRANULOCYTES 0 0.0 - 0.5 %    ABS. NEUTROPHILS 4.0 1.8 - 8.0 K/UL    ABS. LYMPHOCYTES 1.8 0.8 - 3.5 K/UL    ABS. MONOCYTES 0.3 0.0 - 1.0 K/UL    ABS. EOSINOPHILS 0.1 0.0 - 0.4 K/UL    ABS. BASOPHILS 0.0 0.0 - 0.1 K/UL    ABS. IMM. GRANS. 0.0 0.00 - 0.04 K/UL    DF AUTOMATED     METABOLIC PANEL, COMPREHENSIVE   Result Value Ref Range    Sodium 138 136 - 145 mmol/L    Potassium 4.0 3.5 - 5.1 mmol/L    Chloride 106 97 - 108 mmol/L    CO2 27 21 - 32 mmol/L    Anion gap 5 5 - 15 mmol/L    Glucose 86 65 - 100 mg/dL    BUN 8 6 - 20 MG/DL    Creatinine 0.66 0.55 - 1.02 MG/DL    BUN/Creatinine ratio 12 12 - 20      GFR est AA >60 >60 ml/min/1.73m2    GFR est non-AA >60 >60 ml/min/1.73m2    Calcium 9.3 8.5 - 10.1 MG/DL    Bilirubin, total 0.5 0.2 - 1.0 MG/DL    ALT (SGPT) 37 12 - 78 U/L    AST (SGOT) 24 15 - 37 U/L    Alk. phosphatase 47 45 - 117 U/L    Protein, total 8.1 6.4 - 8.2 g/dL    Albumin 3.8 3.5 - 5.0 g/dL    Globulin 4.3 (H) 2.0 - 4.0 g/dL    A-G Ratio 0.9 (L) 1.1 - 2.2     SED RATE (ESR)   Result Value Ref Range    Sed rate, automated 2 0 - 20 mm/hr   C REACTIVE PROTEIN, QT   Result Value Ref Range    C-Reactive protein 0.32 0.00 - 0.60 mg/dL   SAMPLES BEING HELD   Result Value Ref Range    SAMPLES BEING HELD 1RED,1BLU     COMMENT        Add-on orders for these samples will be processed based on acceptable specimen integrity and analyte stability, which may vary by analyte.    CK   Result Value Ref Range    CK 54 26 - 192 U/L   HEMOGLOBIN A1C WITH EAG   Result Value Ref Range    Hemoglobin A1c 4.9 4.0 - 5.6 %    Est. average glucose 94 mg/dL   CBC W/O DIFF   Result Value Ref Range    WBC 6.6 3.6 - 11.0 K/uL    RBC 4.48 3.80 - 5.20 M/uL    HGB 13.5 11.5 - 16.0 g/dL    HCT 40.3 35.0 - 47.0 %    MCV 90.0 80.0 - 99.0 FL    MCH 30.1 26.0 - 34.0 PG    MCHC 33.5 30.0 - 36.5 g/dL    RDW 12.2 11.5 - 14.5 %    PLATELET 932 838 - 978 K/uL    MPV 10.0 8.9 - 12.9 FL    NRBC 0.0 0  WBC    ABSOLUTE NRBC 0.00 0.00 - 0.01 K/uL   TSH 3RD GENERATION   Result Value Ref Range    TSH 0.60 0.36 - 3.74 uIU/mL   SED RATE (ESR)   Result Value Ref Range    Sed rate, automated 10 0 - 20 mm/hr   CRP, HIGH SENSITIVITY   Result Value Ref Range    CRP, High sensitivity 2.6 mg/L   LIPASE   Result Value Ref Range    Lipase 170 73 - 393 U/L   LIPID PANEL   Result Value Ref Range    Cholesterol, total 224 (H) <200 MG/DL    Triglyceride 91 <150 MG/DL    HDL Cholesterol 67 MG/DL    LDL, calculated 138.8 (H) 0 - 100 MG/DL    VLDL, calculated 18.2 MG/DL    CHOL/HDL Ratio 3.3 0.0 - 5.0     MAGNESIUM   Result Value Ref Range    Magnesium 2.1 1.6 - 2.4 mg/dL   PHOSPHORUS   Result Value Ref Range    Phosphorus 4.0 2.6 - 4.7 MG/DL   LYME AB TOTAL W/RFLX W BLOT   Result Value Ref Range    Lyme Ab, IgG/IgM <0.91 0.00 - 0.90 ISR    Lyme Ab Interp. ,EIA <<DO NOT REPORT>>      Lyme Ab Interp. ,EIA <<DO NOT REPORT>>     AQUAPORIN-4 RECEPTOR AB   Result Value Ref Range    NMO IgG <1.5 0.0 - 3.0 U/mL   HCG URINE, QL. - POC   Result Value Ref Range    Pregnancy test,urine (POC) Negative NEG          Exam:  Visit Vitals  /60   Pulse 81   Ht 5' 1\" (1.549 m)   Wt 134 lb (60.8 kg)   SpO2 98%   BMI 25.32 kg/m²     General:  Alert, cooperative, no distress. Head:  Normocephalic, without obvious abnormality, atraumatic. Respiratory:  Heart:   Non labored breathing  RRR   Neck:      Extremities: Warm, no edema   Pulses: 2+ radial pulses       Neurologic:  MS: Alert and oriented x 4, speech intact. Language intact. Attention and fund of knowledge appropriate. Recent and remote memory intact.   Cranial Nerves:  II: visual fields VFF   II: pupils PERRL, no APD seen   II: optic disc    III,VII: ptosis none   III,IV,VI: extraocular muscles  EOMI, no nystagmus or diplopia   V: facial light touch sensation     VII: facial muscle function   symmetric   VIII: hearing intact   IX: soft palate elevation  Elevated sym   XI: trapezius strength     XI: sternocleidomastoid strength    XII: tongue midline     Motor: 5/5 throughout, no PD, no tremors  Sensory:   Coordination: FTN, CRISTINA, HTS intact  Gait: normal gait, able to tandem walk  Reflexes: 2+ sym           Assessment/Plan   Pt is a 29 y.o. right handed female hospitalized 5/12/21 - 5/14/21 after presenting with left optic neuritis, treated with IV Solumedrol, confirmed on MRI brain, no other white matter lesions seen. MRI C-spine with subtle cord signal change at C3-C4. NMO Ab were negative. MRI brain wo 11/17/21 is normal, MRI C-spine wo 11/17/21 is also normal, favor artifact on prior C-spine MRI. No history suggestive of demyelinating disease. No evidence for diagnosis of MS at this time. Currently, only with left ON. F/u in one year with MRI brain at that time. Instructed to call 6 weeks prior to appt for order. Instructed to call with any new sxs. ICD-10-CM ICD-9-CM    1. Optic neuritis, left  H46.9 377.30    2. Abnormal MRI, spinal cord  R90.89 793.99      I spent a total of 37 minutes in both face-to-face activities and non-face-to-face activities for the visit on the date of this encounter. Signed:   Cori Nur MD  11/18/2021

## 2021-11-18 NOTE — PATIENT INSTRUCTIONS
Please call about 6 weeks prior to next appt and I will order MRI of brain so we can review it at f/u appt.

## 2022-03-18 PROBLEM — H46.9 OPTIC NEURITIS, LEFT: Status: ACTIVE | Noted: 2021-05-13

## 2023-01-11 ENCOUNTER — OFFICE VISIT (OUTPATIENT)
Dept: NEUROLOGY | Age: 38
End: 2023-01-11
Payer: COMMERCIAL

## 2023-01-11 VITALS
HEIGHT: 61 IN | OXYGEN SATURATION: 99 % | SYSTOLIC BLOOD PRESSURE: 110 MMHG | DIASTOLIC BLOOD PRESSURE: 60 MMHG | HEART RATE: 75 BPM | BODY MASS INDEX: 25.32 KG/M2

## 2023-01-11 DIAGNOSIS — H46.9 OPTIC NEURITIS, LEFT: Primary | ICD-10-CM

## 2023-01-11 PROCEDURE — 99215 OFFICE O/P EST HI 40 MIN: CPT | Performed by: PSYCHIATRY & NEUROLOGY

## 2023-01-11 NOTE — PROGRESS NOTES
Neurology Progress Note    Patient ID:  Macho Alarcon  [id number]  40 y.o.  1985      Subjective:   History:  Ms. Rodgers Men seen by Dr King Client for L optic neuritis with normal MRI brain treated with IV Solumedrol x 5 days, NMO (-). As per Dr Cortes Dus note:  \"hospitalized 5/12/21 - 5/14/21 after presenting with left optic neuritis, confirmed on MRI brain, no other white matter lesions seen. MRI C-spine with subtle cord signal change at C3-C4. No history suggestive of demyelinating disease and other than subtle left APD, exam was non-focal. S/p IV solumedrol 1g daily x 5 days, currently on steroid taper. NMO Ab were negative\"    Patient last seen by Dr King Client in Nov 2021. Denies any new symptoms or event. Will have occasional blurriness of L vision when exercising and heart rate of 160. Has not seen followed up with eye doctor since event. ROS:  Per HPI-  Otherwise the remainder of ROS was negative    Social Hx  Social History     Socioeconomic History    Marital status:    Occupational History    Occupation: Homemaker, Works their farm, works at [employer] and [employer]   Tobacco Use    Smoking status: Never    Smokeless tobacco: Never   Substance and Sexual Activity    Alcohol use: Not Currently    Drug use: Not Currently   Social History Narrative    Lives with boyfriend and their children in [address]:  Current Outpatient Medications on File Prior to Visit   Medication Sig Dispense Refill    norgestimate-ethinyl estradioL (Debbie Meraz) 0.25-35 mg-mcg tab Take 1 Tab by mouth daily. therapeutic multivitamin (THERAGRAN) tablet Take 1 Tab by mouth daily. (Patient not taking: Reported on 1/11/2023)      ascorbic acid, vitamin C, (VITAMIN C) 500 mg tablet Take 1,000 mg by mouth daily. (Patient not taking: Reported on 1/11/2023)      B.infantis-B.ani-B.long-B.bifi (Probiotic 4X) 10-15 mg TbEC Take  by mouth daily.  (Patient not taking: Reported on 1/11/2023) No current facility-administered medications on file prior to visit. Imaging:    CT Results (recent):  No results found for this or any previous visit. MRI Results (recent):  Results from [place] encounter on 11/17/21    MRI CERV SPINE WO CONT    Narrative  EXAM: MRI CERV SPINE WO CONT    INDICATION: Optic neuritis with possible cervical spinal cord lesion. Evaluate  for change. COMPARISON: Cervical spine MRI on 5/13/2021. TECHNIQUE: MR imaging of the cervical spine was performed using the following  sequences: sagittal T1, T2, STIR;  axial T2, T1.    CONTRAST:  None. FINDINGS:    Subtle cervical kyphosis is centered at C4-C5. There is no subluxation. Vertebral body heights are maintained. Marrow signal is normal. Hyperintense  T1/T2 hemangiomas within the C7 and T1 vertebral bodies are unchanged. Discs are  within normal limits. The craniocervical junction is intact. The course, caliber, and signal intensity  of the spinal cord are normal. Artifact on previous scan is no longer present. The paraspinal soft tissues are within normal limits. C2-C3: No herniation or stenosis. C3-C4: No herniation or stenosis. C4-C5: No herniation or stenosis. C5-C6: No herniation or stenosis. C6-C7: No herniation or stenosis. C7-T1: No herniation or stenosis. Impression  Normal cervical spine MRI. Normal cervical spinal cord. Finding on previous MRI represents artifact versus cord edema which has now  resolved. IR Results (recent):  No results found for this or any previous visit. VAS/US Results (recent):  No results found for this or any previous visit. Reviewed records in [facility] [employer] tab today    Lab Review     No visits with results within 3 Month(s) from this visit.    Latest known visit with results is:   Admission on 05/12/2021, Discharged on 05/14/2021   Component Date Value Ref Range Status    Color 05/12/2021 YELLOW/STRAW    Final Color Reference Range: Straw, Yellow or Dark Yellow    Appearance 05/12/2021 CLEAR  CLEAR   Final    Specific gravity 05/12/2021 1.009  1.003 - 1.030   Final    pH (UA) 05/12/2021 7.0  5.0 - 8.0   Final    Protein 05/12/2021 Negative  NEG mg/dL Final    Glucose 05/12/2021 Negative  NEG mg/dL Final    Ketone 05/12/2021 Negative  NEG mg/dL Final    Bilirubin 05/12/2021 Negative  NEG   Final    Blood 05/12/2021 Negative  NEG   Final    Urobilinogen 05/12/2021 0.2  0.2 - 1.0 EU/dL Final    Nitrites 05/12/2021 Negative  NEG   Final    Leukocyte Esterase 05/12/2021 Negative  NEG   Final    WBC 05/12/2021 0-4  0 - 4 /hpf Final    RBC 05/12/2021 0-5  0 - 5 /hpf Final    Epithelial cells 05/12/2021 FEW  FEW /lpf Final    Epithelial cell category consists of squamous cells and /or transitional urothelial cells. Renal tubular cells, if present, are separately identified as such. Bacteria 05/12/2021 Negative  NEG /hpf Final    Hyaline cast 05/12/2021 0-2  0 - 5 /lpf Final    Urine culture hold 05/12/2021 Urine on hold in Microbiology dept for 2 days. If unpreserved urine is submitted, it cannot be used for addtional testing after 24 hours, recollection will be required.     Final    WBC 05/12/2021 6.3  3.6 - 11.0 K/uL Final    RBC 05/12/2021 4.62  3.80 - 5.20 M/uL Final    HGB 05/12/2021 13.9  11.5 - 16.0 g/dL Final    HCT 05/12/2021 42.1  35.0 - 47.0 % Final    MCV 05/12/2021 91.1  80.0 - 99.0 FL Final    MCH 05/12/2021 30.1  26.0 - 34.0 PG Final    MCHC 05/12/2021 33.0  30.0 - 36.5 g/dL Final    RDW 05/12/2021 12.5  11.5 - 14.5 % Final    PLATELET 88/45/8653 754  150 - 400 K/uL Final    MPV 05/12/2021 10.2  8.9 - 12.9 FL Final    NRBC 05/12/2021 0.0  0  WBC Final    ABSOLUTE NRBC 05/12/2021 0.00  0.00 - 0.01 K/uL Final    NEUTROPHILS 05/12/2021 64  32 - 75 % Final    LYMPHOCYTES 05/12/2021 28  12 - 49 % Final    MONOCYTES 05/12/2021 5  5 - 13 % Final    EOSINOPHILS 05/12/2021 2  0 - 7 % Final    BASOPHILS 05/12/2021 1  0 - 1 % Final    IMMATURE GRANULOCYTES 05/12/2021 0  0.0 - 0.5 % Final    ABS. NEUTROPHILS 05/12/2021 4.0  1.8 - 8.0 K/UL Final    ABS. LYMPHOCYTES 05/12/2021 1.8  0.8 - 3.5 K/UL Final    ABS. MONOCYTES 05/12/2021 0.3  0.0 - 1.0 K/UL Final    ABS. EOSINOPHILS 05/12/2021 0.1  0.0 - 0.4 K/UL Final    ABS. BASOPHILS 05/12/2021 0.0  0.0 - 0.1 K/UL Final    ABS. IMM. GRANS. 05/12/2021 0.0  0.00 - 0.04 K/UL Final    DF 05/12/2021 AUTOMATED    Final    Sodium 05/12/2021 138  136 - 145 mmol/L Final    Potassium 05/12/2021 4.0  3.5 - 5.1 mmol/L Final    Chloride 05/12/2021 106  97 - 108 mmol/L Final    CO2 05/12/2021 27  21 - 32 mmol/L Final    Anion gap 05/12/2021 5  5 - 15 mmol/L Final    Glucose 05/12/2021 86  65 - 100 mg/dL Final    BUN 05/12/2021 8  6 - 20 MG/DL Final    Creatinine 05/12/2021 0.66  0.55 - 1.02 MG/DL Final    BUN/Creatinine ratio 05/12/2021 12  12 - 20   Final    GFR est AA 05/12/2021 >60  >60 ml/min/1.73m2 Final    GFR est non-AA 05/12/2021 >60  >60 ml/min/1.73m2 Final    Estimated GFR is calculated using the IDMS-traceable Modification of Diet in Renal Disease (MDRD) Study equation, reported for both  Americans (GFRAA) and non- Americans (GFRNA), and normalized to 1.73m2 body surface area. The physician must decide which value applies to the patient. Calcium 05/12/2021 9.3  8.5 - 10.1 MG/DL Final    Bilirubin, total 05/12/2021 0.5  0.2 - 1.0 MG/DL Final    ALT (SGPT) 05/12/2021 37  12 - 78 U/L Final    AST (SGOT) 05/12/2021 24  15 - 37 U/L Final    Alk.  phosphatase 05/12/2021 47  45 - 117 U/L Final    Protein, total 05/12/2021 8.1  6.4 - 8.2 g/dL Final    Albumin 05/12/2021 3.8  3.5 - 5.0 g/dL Final    Globulin 05/12/2021 4.3 (A)  2.0 - 4.0 g/dL Final    A-G Ratio 05/12/2021 0.9 (A)  1.1 - 2.2   Final    Sed rate, automated 05/12/2021 2  0 - 20 mm/hr Final    C-Reactive protein 05/12/2021 0.32  0.00 - 0.60 mg/dL Final    Comment: CRP is a nonspecific acute phase reactant that shows rapid, marked increases with inflammation, infection, trauma, tissue necrosis, malignancies and autoimmune diseases. Sequential CRP levels are useful in monitoring response to antibacterial therapy. This assay is not equivalent to the hsCRP test since the presence of one or more of the foregoing disease processes obviates the risk stratification information available from hsCRP testing. SAMPLES BEING HELD 05/12/2021 1RED,1BLU   Final    COMMENT 05/12/2021 Add-on orders for these samples will be processed based on acceptable specimen integrity and analyte stability, which may vary by analyte. Final    Pregnancy test,urine (POC) 05/12/2021 Negative  NEG   Final    CK 05/12/2021 54  26 - 192 U/L Final    Hemoglobin A1c 05/13/2021 4.9  4.0 - 5.6 % Final    Comment: NEW METHOD  PLEASE NOTE NEW REFERENCE RANGE  (NOTE)  HbA1C Interpretive Ranges  <5.7              Normal  5.7 - 6.4         Consider Prediabetes  >6.5              Consider Diabetes      Est. average glucose 05/13/2021 94  mg/dL Final    WBC 05/13/2021 6.6  3.6 - 11.0 K/uL Final    RBC 05/13/2021 4.48  3.80 - 5.20 M/uL Final    HGB 05/13/2021 13.5  11.5 - 16.0 g/dL Final    HCT 05/13/2021 40.3  35.0 - 47.0 % Final    MCV 05/13/2021 90.0  80.0 - 99.0 FL Final    MCH 05/13/2021 30.1  26.0 - 34.0 PG Final    MCHC 05/13/2021 33.5  30.0 - 36.5 g/dL Final    RDW 05/13/2021 12.2  11.5 - 14.5 % Final    PLATELET 87/62/7431 854  150 - 400 K/uL Final    MPV 05/13/2021 10.0  8.9 - 12.9 FL Final    NRBC 05/13/2021 0.0  0  WBC Final    ABSOLUTE NRBC 05/13/2021 0.00  0.00 - 0.01 K/uL Final    TSH 05/13/2021 0.60  0.36 - 3.74 uIU/mL Final    Comment:      Due to TSH heterogeneity, both structurally and degree of glycosylation, monoclonal antibodies used in the TSH assay may not accurately quantitate TSH. Therefore, this result should be correlated with clinical findings as well as with other assessments of thyroid function, e.g., free T4, free T3.       Sed rate, automated 05/13/2021 10  0 - 20 mm/hr Final    CRP, High sensitivity 05/13/2021 2.6  mg/L Final    Comment: (NOTE)   Value                          Risk    <1.0 mg/L                        Low  1.0-3.0 mg/L                     Average  >3.0 mg/L                        High    CRP is a nonspecific marker of inflammation and conditions other than   atherosclerosis may cause elevated levels. If first result >3.0 mg/L,   consider repeating at least 2 weeks later with patient in a   metabolically stable state, free of infection or acute illness. Lipase 05/12/2021 170  73 - 393 U/L Final    Cholesterol, total 05/12/2021 224 (A)  <200 MG/DL Final    Triglyceride 05/12/2021 91  <150 MG/DL Final    Based on NCEP-ATP III:  Triglycerides <150 mg/dL  is considered normal, 150-199 mg/dL  borderline high,  200-499 mg/dL high and  greater than or equal to 500 mg/dL very high. HDL Cholesterol 05/12/2021 67  MG/DL Final    Based on NCEP ATP III, HDL Cholesterol <40 mg/dL is considered low and >60 mg/dL is elevated. LDL, calculated 05/12/2021 138.8 (A)  0 - 100 MG/DL Final    Comment: Based on the NCEP-ATP: LDL-C concentrations are considered  optimal <100 mg/dL,  near optimal/above Normal 100-129 mg/dL  Borderline High: 130-159, High: 160-189 mg/dL  Very High: Greater than or equal to 190 mg/dL      VLDL, calculated 05/12/2021 18.2  MG/DL Final    CHOL/HDL Ratio 05/12/2021 3.3  0.0 - 5.0   Final    Magnesium 05/12/2021 2.1  1.6 - 2.4 mg/dL Final    Phosphorus 05/12/2021 4.0  2.6 - 4.7 MG/DL Final    Lyme Ab, IgG/IgM 05/13/2021 <0.91  0.00 - 0.90 ISR Final    Comment: (NOTE)                                 Negative         <0.91                                 Equivocal  0.91 - 1.09                                 Positive         >1.09  Performed At: [address], [address] [place] [id number]  Jama Sauceda MD [id number]      Lyme Ab Interp. ,EIA 05/13/2021 <<DO NOT REPORT>>    Final    Lyme Ab Interp. ,EIA 05/13/2021 <<DO NOT REPORT>>    Final    NMO IgG 05/13/2021 <1.5  0.0 - 3.0 U/mL Final    Comment: (NOTE)                              Negative:      0.0 - 3.0                              Positive:           >3.0  Performed At: [facility] & [address] [id number]  Emily Winters MD SS:[id number]           Objective:       Exam:  Visit Vitals  /60   Pulse 75   Ht 5' 1\" (1.549 m)   SpO2 99%   BMI 25.32 kg/m²     Gen: Awake, alert, follows commands  Appropriate appearance, normal speech. Oriented to all spheres. No visual field defect on confrontation exam.  Full eyes movement, with no nystagmus, no diplopia, no ptosis. Normal gag and swallow. All remaining cranial nerves were normal  Motor function: 5/5 in all extremities  Sensory: intact to LT, PP and JPS  DTRs ++ in all extremities, (-) Babinski  Good FTN and HTS   Gait: Normal    Assessment:       ICD-10-CM ICD-9-CM    1. Optic neuritis, left  H46.9 377.30 MRI BRAIN W WO CONT      MRI CERV SPINE W WO CONT        Evaluate for evidence of MS    Plan:   Reviewed medical records, MRI brain and cervical spine in EPIC  2. Will repeat MRI Brain and cervical spine with [facility]. Patient to call for results  3. Advise to follow up with her ophthalmologist at [facility] to look for interval improvement of L optic neuritis  4. Will consider spinal tap if MRI shows interval changes. If normal, will observe. Follow-up and Dispositions    Return for review of results. I spent total of 40 mins with the patient, greater than 50% of the visit was spent on providing counseling and coordination of care.          Reynolds Cooks, MD  Diplomate, American Board of Psychiatry and Neurology  Diplomate, Neuromuscular Medicine  Diplomate, American Board of Electrodiagnostic Medicine

## 2023-01-23 ENCOUNTER — HOSPITAL ENCOUNTER (OUTPATIENT)
Dept: MRI IMAGING | Age: 38
End: 2023-01-23
Payer: COMMERCIAL

## 2023-01-23 ENCOUNTER — HOSPITAL ENCOUNTER (OUTPATIENT)
Dept: MRI IMAGING | Age: 38
Discharge: HOME | End: 2023-01-23
Payer: COMMERCIAL

## 2023-01-23 DIAGNOSIS — H46.9 OPTIC NEURITIS, LEFT: ICD-10-CM

## 2023-01-23 PROCEDURE — 72156 MRI NECK SPINE W/O & W/DYE: CPT

## 2023-01-23 PROCEDURE — 70553 MRI BRAIN STEM W/O & W/DYE: CPT

## 2023-01-23 PROCEDURE — 74011250636 HC RX REV CODE- 250/636: Performed by: STUDENT IN AN ORGANIZED HEALTH CARE EDUCATION/TRAINING PROGRAM

## 2023-01-23 PROCEDURE — A9577 INJ MULTIHANCE: HCPCS | Performed by: STUDENT IN AN ORGANIZED HEALTH CARE EDUCATION/TRAINING PROGRAM

## 2023-01-23 RX ADMIN — GADOBENATE DIMEGLUMINE 11 ML: 529 INJECTION, SOLUTION INTRAVENOUS at 14:51

## 2023-01-26 ENCOUNTER — TELEPHONE (OUTPATIENT)
Dept: NEUROLOGY | Age: 38
End: 2023-01-26

## 2023-01-26 ENCOUNTER — PATIENT MESSAGE (OUTPATIENT)
Dept: NEUROLOGY | Age: 38
End: 2023-01-26

## 2023-01-26 NOTE — TELEPHONE ENCOUNTER
----- Message from Live Johnson MD sent at 1/25/2023  3:26 PM EST -----  Schedule follow up to discuss results  EF    ----- Message -----  From: Yoan Melendez Results In  Sent: 1/25/2023   7:46 AM EST  To: Live Johnson MD

## 2023-01-26 NOTE — TELEPHONE ENCOUNTER
Called pt. Verified. Inform pt that Dr. Irby Homans states a follow up appt is needed to discuss MRI results. Pt states that she will send mychart message regarding dates and times that she is available next week.

## 2023-02-01 ENCOUNTER — OFFICE VISIT (OUTPATIENT)
Dept: NEUROLOGY | Age: 38
End: 2023-02-01
Payer: COMMERCIAL

## 2023-02-01 VITALS
SYSTOLIC BLOOD PRESSURE: 120 MMHG | HEIGHT: 61 IN | OXYGEN SATURATION: 100 % | HEART RATE: 81 BPM | DIASTOLIC BLOOD PRESSURE: 70 MMHG | BODY MASS INDEX: 25.32 KG/M2

## 2023-02-01 DIAGNOSIS — H46.9 OPTIC NEURITIS, LEFT: Primary | ICD-10-CM

## 2023-02-01 PROCEDURE — 99214 OFFICE O/P EST MOD 30 MIN: CPT | Performed by: PSYCHIATRY & NEUROLOGY

## 2023-02-01 NOTE — PROGRESS NOTES
Neurology Progress Note    Patient ID:  Vik Melo  [id number]  40 y.o.  1985      Subjective:   History:  Ms. Trino Kimbrough seen by Dr Doc Isidro for L optic neuritis with normal MRI brain treated with IV Solumedrol x 5 days, NMO (-). As per Dr Annalee Ramachandran note:  \"hospitalized 5/12/21 - 5/14/21 after presenting with left optic neuritis, confirmed on MRI brain, no other white matter lesions seen. MRI C-spine with subtle cord signal change at C3-C4. No history suggestive of demyelinating disease and other than subtle left APD, exam was non-focal. S/p IV solumedrol 1g daily x 5 days, currently on steroid taper. NMO Ab were negative\"     Patient last seen by Dr Doc Isidro in Nov 2021. Denies any new symptoms or event. Will have occasional blurriness of L vision when exercising and heart rate of 160. Has not seen followed up with eye doctor since event. Patient remains stable. Denies hormonal changes . ROS:  Per HPI-  Otherwise the remainder of ROS was negative    Social Hx  Social History     Socioeconomic History    Marital status:    Occupational History    Occupation: Homemaker, Works their farm, works at [employer] and [employer]   Tobacco Use    Smoking status: Never    Smokeless tobacco: Never   Substance and Sexual Activity    Alcohol use: Not Currently    Drug use: Not Currently   Social History Narrative    Lives with boyfriend and their children in [address]:  Current Outpatient Medications on File Prior to Visit   Medication Sig Dispense Refill    norgestimate-ethinyl estradioL (Thedore Chris) 0.25-35 mg-mcg tab Take 1 Tab by mouth daily. No current facility-administered medications on file prior to visit. Imaging:    CT Results (recent):  No results found for this or any previous visit.       MRI Results (recent):  Results from [place] encounter on 01/23/23    MRI CERV SPINE W WO CONT    Narrative  EXAM: MRI CERV SPINE W WO CONT    INDICATION: Left optic neuritis; evaluate for transverse myelitis or  demyelination. COMPARISON: MRI cervical spine on 11/17/2021 and 5/13/2021. TECHNIQUE: MR imaging of the cervical spine was performed using the following  sequences: sagittal T1, T2, STIR;  axial T2, T1 prior to and following contrast  administration. Examination presented for my interpretation on 1/25/2023. CONTRAST: 11 mL of MultiHance. FINDINGS:    There is normal alignment of the cervical spine. Vertebral body heights are  maintained. Marrow signal is normal. Discs are within normal limits. The craniocervical junction is intact. The course, caliber, and signal intensity  of the spinal cord are normal. There is no pathologic intrathecal enhancement. The paraspinal soft tissues are within normal limits. C2-C3: No herniation or stenosis. C3-C4: No herniation or stenosis. C4-C5: No herniation or stenosis. C5-C6: No herniation or stenosis. C6-C7: No herniation or stenosis. C7-T1: No herniation or stenosis. Impression  Normal MRI cervical spine. No demyelination or pathologic enhancement. IR Results (recent):  No results found for this or any previous visit. VAS/US Results (recent):  No results found for this or any previous visit. Reviewed records in [employer] and [employer] tab today    Lab Review     No visits with results within 3 Month(s) from this visit.    Latest known visit with results is:   Admission on 05/12/2021, Discharged on 05/14/2021   Component Date Value Ref Range Status    Color 05/12/2021 YELLOW/STRAW    Final    Color Reference Range: Straw, Yellow or Dark Yellow    Appearance 05/12/2021 CLEAR  CLEAR   Final    Specific gravity 05/12/2021 1.009  1.003 - 1.030   Final    pH (UA) 05/12/2021 7.0  5.0 - 8.0   Final    Protein 05/12/2021 Negative  NEG mg/dL Final    Glucose 05/12/2021 Negative  NEG mg/dL Final    Ketone 05/12/2021 Negative  NEG mg/dL Final    Bilirubin 05/12/2021 Negative  NEG   Final    Blood 05/12/2021 Negative  NEG   Final    Urobilinogen 05/12/2021 0.2  0.2 - 1.0 EU/dL Final    Nitrites 05/12/2021 Negative  NEG   Final    Leukocyte Esterase 05/12/2021 Negative  NEG   Final    WBC 05/12/2021 0-4  0 - 4 /hpf Final    RBC 05/12/2021 0-5  0 - 5 /hpf Final    Epithelial cells 05/12/2021 FEW  FEW /lpf Final    Epithelial cell category consists of squamous cells and /or transitional urothelial cells. Renal tubular cells, if present, are separately identified as such. Bacteria 05/12/2021 Negative  NEG /hpf Final    Hyaline cast 05/12/2021 0-2  0 - 5 /lpf Final    Urine culture hold 05/12/2021 Urine on hold in Microbiology dept for 2 days. If unpreserved urine is submitted, it cannot be used for addtional testing after 24 hours, recollection will be required. Final    WBC 05/12/2021 6.3  3.6 - 11.0 K/uL Final    RBC 05/12/2021 4.62  3.80 - 5.20 M/uL Final    HGB 05/12/2021 13.9  11.5 - 16.0 g/dL Final    HCT 05/12/2021 42.1  35.0 - 47.0 % Final    MCV 05/12/2021 91.1  80.0 - 99.0 FL Final    MCH 05/12/2021 30.1  26.0 - 34.0 PG Final    MCHC 05/12/2021 33.0  30.0 - 36.5 g/dL Final    RDW 05/12/2021 12.5  11.5 - 14.5 % Final    PLATELET 37/99/4063 283  150 - 400 K/uL Final    MPV 05/12/2021 10.2  8.9 - 12.9 FL Final    NRBC 05/12/2021 0.0  0  WBC Final    ABSOLUTE NRBC 05/12/2021 0.00  0.00 - 0.01 K/uL Final    NEUTROPHILS 05/12/2021 64  32 - 75 % Final    LYMPHOCYTES 05/12/2021 28  12 - 49 % Final    MONOCYTES 05/12/2021 5  5 - 13 % Final    EOSINOPHILS 05/12/2021 2  0 - 7 % Final    BASOPHILS 05/12/2021 1  0 - 1 % Final    IMMATURE GRANULOCYTES 05/12/2021 0  0.0 - 0.5 % Final    ABS. NEUTROPHILS 05/12/2021 4.0  1.8 - 8.0 K/UL Final    ABS. LYMPHOCYTES 05/12/2021 1.8  0.8 - 3.5 K/UL Final    ABS. MONOCYTES 05/12/2021 0.3  0.0 - 1.0 K/UL Final    ABS. EOSINOPHILS 05/12/2021 0.1  0.0 - 0.4 K/UL Final    ABS.  BASOPHILS 05/12/2021 0.0  0.0 - 0.1 K/UL Final ABS. IMM. GRANS. 05/12/2021 0.0  0.00 - 0.04 K/UL Final    DF 05/12/2021 AUTOMATED    Final    Sodium 05/12/2021 138  136 - 145 mmol/L Final    Potassium 05/12/2021 4.0  3.5 - 5.1 mmol/L Final    Chloride 05/12/2021 106  97 - 108 mmol/L Final    CO2 05/12/2021 27  21 - 32 mmol/L Final    Anion gap 05/12/2021 5  5 - 15 mmol/L Final    Glucose 05/12/2021 86  65 - 100 mg/dL Final    BUN 05/12/2021 8  6 - 20 MG/DL Final    Creatinine 05/12/2021 0.66  0.55 - 1.02 MG/DL Final    BUN/Creatinine ratio 05/12/2021 12  12 - 20   Final    GFR est AA 05/12/2021 >60  >60 ml/min/1.73m2 Final    GFR est non-AA 05/12/2021 >60  >60 ml/min/1.73m2 Final    Estimated GFR is calculated using the IDMS-traceable Modification of Diet in Renal Disease (MDRD) Study equation, reported for both  Americans (GFRAA) and non- Americans (GFRNA), and normalized to 1.73m2 body surface area. The physician must decide which value applies to the patient. Calcium 05/12/2021 9.3  8.5 - 10.1 MG/DL Final    Bilirubin, total 05/12/2021 0.5  0.2 - 1.0 MG/DL Final    ALT (SGPT) 05/12/2021 37  12 - 78 U/L Final    AST (SGOT) 05/12/2021 24  15 - 37 U/L Final    Alk. phosphatase 05/12/2021 47  45 - 117 U/L Final    Protein, total 05/12/2021 8.1  6.4 - 8.2 g/dL Final    Albumin 05/12/2021 3.8  3.5 - 5.0 g/dL Final    Globulin 05/12/2021 4.3 (A)  2.0 - 4.0 g/dL Final    A-G Ratio 05/12/2021 0.9 (A)  1.1 - 2.2   Final    Sed rate, automated 05/12/2021 2  0 - 20 mm/hr Final    C-Reactive protein 05/12/2021 0.32  0.00 - 0.60 mg/dL Final    Comment: CRP is a nonspecific acute phase reactant that shows rapid, marked increases with inflammation, infection, trauma, tissue necrosis, malignancies and autoimmune diseases. Sequential CRP levels are useful in monitoring response to antibacterial therapy.   This assay is not equivalent to the hsCRP test since the presence of one or more of the foregoing disease processes obviates the risk stratification information available from hsCRP testing. SAMPLES BEING HELD 05/12/2021 1RED,1BLU   Final    COMMENT 05/12/2021 Add-on orders for these samples will be processed based on acceptable specimen integrity and analyte stability, which may vary by analyte. Final    Pregnancy test,urine (POC) 05/12/2021 Negative  NEG   Final    CK 05/12/2021 54  26 - 192 U/L Final    Hemoglobin A1c 05/13/2021 4.9  4.0 - 5.6 % Final    Comment: NEW METHOD  PLEASE NOTE NEW REFERENCE RANGE  (NOTE)  HbA1C Interpretive Ranges  <5.7              Normal  5.7 - 6.4         Consider Prediabetes  >6.5              Consider Diabetes      Est. average glucose 05/13/2021 94  mg/dL Final    WBC 05/13/2021 6.6  3.6 - 11.0 K/uL Final    RBC 05/13/2021 4.48  3.80 - 5.20 M/uL Final    HGB 05/13/2021 13.5  11.5 - 16.0 g/dL Final    HCT 05/13/2021 40.3  35.0 - 47.0 % Final    MCV 05/13/2021 90.0  80.0 - 99.0 FL Final    MCH 05/13/2021 30.1  26.0 - 34.0 PG Final    MCHC 05/13/2021 33.5  30.0 - 36.5 g/dL Final    RDW 05/13/2021 12.2  11.5 - 14.5 % Final    PLATELET 16/89/9598 626  150 - 400 K/uL Final    MPV 05/13/2021 10.0  8.9 - 12.9 FL Final    NRBC 05/13/2021 0.0  0  WBC Final    ABSOLUTE NRBC 05/13/2021 0.00  0.00 - 0.01 K/uL Final    TSH 05/13/2021 0.60  0.36 - 3.74 uIU/mL Final    Comment:      Due to TSH heterogeneity, both structurally and degree of glycosylation, monoclonal antibodies used in the TSH assay may not accurately quantitate TSH. Therefore, this result should be correlated with clinical findings as well as with other assessments of thyroid function, e.g., free T4, free T3.       Sed rate, automated 05/13/2021 10  0 - 20 mm/hr Final    CRP, High sensitivity 05/13/2021 2.6  mg/L Final    Comment: (NOTE)   Value                          Risk    <1.0 mg/L                        Low  1.0-3.0 mg/L                     Average  >3.0 mg/L                        High    CRP is a nonspecific marker of inflammation and conditions other than   atherosclerosis may cause elevated levels. If first result >3.0 mg/L,   consider repeating at least 2 weeks later with patient in a   metabolically stable state, free of infection or acute illness. Lipase 05/12/2021 170  73 - 393 U/L Final    Cholesterol, total 05/12/2021 224 (A)  <200 MG/DL Final    Triglyceride 05/12/2021 91  <150 MG/DL Final    Based on NCEP-ATP III:  Triglycerides <150 mg/dL  is considered normal, 150-199 mg/dL  borderline high,  200-499 mg/dL high and  greater than or equal to 500 mg/dL very high. HDL Cholesterol 05/12/2021 67  MG/DL Final    Based on NCEP ATP III, HDL Cholesterol <40 mg/dL is considered low and >60 mg/dL is elevated. LDL, calculated 05/12/2021 138.8 (A)  0 - 100 MG/DL Final    Comment: Based on the NCEP-ATP: LDL-C concentrations are considered  optimal <100 mg/dL,  near optimal/above Normal 100-129 mg/dL  Borderline High: 130-159, High: 160-189 mg/dL  Very High: Greater than or equal to 190 mg/dL      VLDL, calculated 05/12/2021 18.2  MG/DL Final    CHOL/HDL Ratio 05/12/2021 3.3  0.0 - 5.0   Final    Magnesium 05/12/2021 2.1  1.6 - 2.4 mg/dL Final    Phosphorus 05/12/2021 4.0  2.6 - 4.7 MG/DL Final    Lyme Ab, IgG/IgM 05/13/2021 <0.91  0.00 - 0.90 ISR Final    Comment: (NOTE)                                 Negative         <0.91                                 Equivocal  0.91 - 1.09                                 Positive         >1.09  Performed At: [address] [id number]  [place] MD [id number]      Lyme Ab Interp. ,EIA 05/13/2021 <<DO NOT REPORT>>    Final    Lyme Ab Interp. ,EIA 05/13/2021 <<DO NOT REPORT>>    Final    NMO IgG 05/13/2021 <1.5  0.0 - 3.0 U/mL Final    Comment: (NOTE)                              Negative:      0.0 - 3.0                              Positive:           >3.0  Performed At: [address] [id number]  [place] MD [id number] Objective:       Exam:  Visit Vitals  /70   Pulse 81   Ht 5' 1\" (1.549 m)   SpO2 100%   BMI 25.32 kg/m²     Gen: Awake, alert, follows commands  Appropriate appearance, normal speech. Oriented to all spheres. No visual field defect on confrontation exam.  Full eyes movement, with no nystagmus, no diplopia, no ptosis. Normal gag and swallow. All remaining cranial nerves were normal  Motor function: 5/5 in all extremities  Sensory: intact to LT, PP and JPS  Good FTN and HTS   Gait: Normal    Assessment:       ICD-10-CM ICD-9-CM    1. Optic neuritis, left  H46.9 377.30           L optic neuritis    MRI brain: Mild atrophy and signal abnormality in the intracanalicular left optic nerve, consistent with sequela of prior optic neuritis. No evidence of active optic neuritis. 2. No other evidence of intracranial demyelinating disease. No significant  intracranial abnormality. 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 More likely this is an incidental finding  3. Advise to follow up with her ophthalmologist at [facility] to look for interval improvement of L optic neuritis  4. Will observe for development of new symptoms for now      Follow-up and Dispositions    Return if symptoms worsen or fail to improve.                Barbara Posada MD  Diplomate, American Board of Psychiatry and Neurology  Diplomate, Neuromuscular Medicine  Diplomate, American Board of Electrodiagnostic Medicine

## 2023-05-17 RX ORDER — NORGESTIMATE AND ETHINYL ESTRADIOL 0.25-0.035
1 KIT ORAL DAILY
COMMUNITY

## 2024-08-20 ENCOUNTER — HOSPITAL ENCOUNTER (OUTPATIENT)
Facility: HOSPITAL | Age: 39
Discharge: HOME | End: 2024-08-23
Attending: STUDENT IN AN ORGANIZED HEALTH CARE EDUCATION/TRAINING PROGRAM
Payer: COMMERCIAL

## 2024-08-20 DIAGNOSIS — N85.8 DECIDUOMATOSIS: ICD-10-CM

## 2024-08-20 PROCEDURE — 6360000004 HC RX CONTRAST MEDICATION: Performed by: STUDENT IN AN ORGANIZED HEALTH CARE EDUCATION/TRAINING PROGRAM

## 2024-08-20 PROCEDURE — 72197 MRI PELVIS W/O & W/DYE: CPT

## 2024-08-20 PROCEDURE — A9577 INJ MULTIHANCE: HCPCS | Performed by: STUDENT IN AN ORGANIZED HEALTH CARE EDUCATION/TRAINING PROGRAM

## 2024-08-20 RX ADMIN — GADOBENATE DIMEGLUMINE 12 ML: 529 INJECTION, SOLUTION INTRAVENOUS at 11:30
